# Patient Record
Sex: FEMALE | Race: BLACK OR AFRICAN AMERICAN | URBAN - METROPOLITAN AREA
[De-identification: names, ages, dates, MRNs, and addresses within clinical notes are randomized per-mention and may not be internally consistent; named-entity substitution may affect disease eponyms.]

---

## 2017-03-28 ENCOUNTER — OFFICE VISIT (OUTPATIENT)
Dept: FAMILY MEDICINE CLINIC | Age: 47
End: 2017-03-28

## 2017-03-28 VITALS
WEIGHT: 219 LBS | HEIGHT: 64 IN | BODY MASS INDEX: 37.39 KG/M2 | TEMPERATURE: 98.2 F | SYSTOLIC BLOOD PRESSURE: 135 MMHG | HEART RATE: 89 BPM | RESPIRATION RATE: 17 BRPM | DIASTOLIC BLOOD PRESSURE: 85 MMHG

## 2017-03-28 DIAGNOSIS — I10 ESSENTIAL HYPERTENSION: ICD-10-CM

## 2017-03-28 DIAGNOSIS — Z99.89 OSA ON CPAP: ICD-10-CM

## 2017-03-28 DIAGNOSIS — E66.9 OBESITY (BMI 30-39.9): Primary | ICD-10-CM

## 2017-03-28 DIAGNOSIS — G47.33 OSA ON CPAP: ICD-10-CM

## 2017-03-28 DIAGNOSIS — E55.9 VITAMIN D DEFICIENCY: ICD-10-CM

## 2017-03-28 NOTE — PATIENT INSTRUCTIONS
Meal Goals:  Shake at Breakfast, Lunch and afternoon    Meal at night: Low-carb + Protein + Veggies (Carb goal is 40 - 50 grams, Protein 30 - 40, Kcal 400 to 500)    Look into getting a:    Counselor      Let's set a goal of by our appointment in July:  She's comfortable with setting a goal of losing 20 lb in 16 weeks. Body Mass Index: Care Instructions  Your Care Instructions    Body mass index (BMI) can help you see if your weight is raising your risk for health problems. It uses a formula to compare how much you weigh with how tall you are. A BMI between 18.5 and 24.9 is considered healthy. A BMI between 25 and 29.9 is considered overweight. A BMI of 30 or higher is considered obese. If your BMI is in the normal range, it means that you have a lower risk for weight-related health problems. If your BMI is in the overweight or obese range, you may be at increased risk for weight-related health problems, such as high blood pressure, heart disease, stroke, arthritis or joint pain, and diabetes. BMI is just one measure of your risk for weight-related health problems. You may be at higher risk for health problems if you are not active, you eat an unhealthy diet, or you drink too much alcohol or use tobacco products. Follow-up care is a key part of your treatment and safety. Be sure to make and go to all appointments, and call your doctor if you are having problems. It's also a good idea to know your test results and keep a list of the medicines you take. How can you care for yourself at home? · Practice healthy eating habits. This includes eating plenty of fruits, vegetables, whole grains, lean protein, and low-fat dairy. · Get at least 30 minutes of exercise 5 days a week or more. Brisk walking is a good choice. You also may want to do other activities, such as running, swimming, cycling, or playing tennis or team sports. · Do not smoke. Smoking can increase your risk for health problems.  If you need help quitting, talk to your doctor about stop-smoking programs and medicines. These can increase your chances of quitting for good. · Limit alcohol to 2 drinks a day for men and 1 drink a day for women. Too much alcohol can cause health problems. If you have a BMI higher than 25  · Your doctor may do other tests to check your risk for weight-related health problems. This may include measuring the distance around your waist. A waist measurement of more than 40 inches in men or 35 inches in women can increase the risk of weight-related health problems. · Talk with your doctor about steps you can take to stay healthy or improve your health. You may need to make lifestyle changes to lose weight and stay healthy, such as changing your diet and getting regular exercise. Where can you learn more? Go to http://ashley-ivan.info/. Enter S176 in the search box to learn more about \"Body Mass Index: Care Instructions. \"  Current as of: February 16, 2016  Content Version: 11.1  © 0292-1423 [employer]. Care instructions adapted under license by [employer] (which disclaims liability or warranty for this information). If you have questions about a medical condition or this instruction, always ask your healthcare professional. [place] [address] any warranty or liability for your use of this information.

## 2017-03-28 NOTE — Clinical Note
I had a long discussion w/ Anna Barreto and her father about whether she is ready to take advantage of being in the program.  Look into getting a:  Counselor   Let's set a goal of by our appointment in July: She's comfortable with setting a goal of losing 20 lb in 16 weeks.

## 2017-03-28 NOTE — PROGRESS NOTES
[facility] Progress Note:   F/up Physician Visit    CC: Weight Management  Violet Castle is a 48 y.o. female who is here for her  f/up physician visit for the VLCD / LCD Program.    Weight History  Ideal body weight: 120 lb 9.5 oz (54.7 kg)  Adjusted ideal body weight: 159 lb 15.3 oz (72.6 kg) (Based on [employer] Tables)      Goal wt = 150 lb  Wt went up as high as 232 lb last month (around the time she had her TIA and her BP went up)      Wt Readings from Last 10 Encounters:   03/28/17 219 lb (99.3 kg)   12/20/16 216 lb (98 kg)   08/11/16 198 lb 3.2 oz (89.9 kg)   08/02/16 196 lb 3.2 oz (89 kg)   07/28/16 197 lb 12.8 oz (89.7 kg)   07/22/16 193 lb 12.8 oz (87.9 kg)   07/13/16 196 lb 12.8 oz (89.3 kg)   07/07/16 189 lb 6.4 oz (85.9 kg)   06/07/16 198 lb 3.2 oz (89.9 kg)   05/23/16 192 lb 9.6 oz (87.4 kg)       Weight Metrics 3/28/2017 12/20/2016 12/20/2016 8/11/2016 8/8/2016 8/2/2016 8/2/2016   Weight 219 lb - 216 lb - 198 lb 3.2 oz - 196 lb 3.2 oz   Waist Measure Inches - 39 - 36 - 35 -   Body Fat % - 37 - - - 39 -   BMI 37.59 kg/m2 - 37.08 kg/m2 - 34.02 kg/m2 - 33.66 kg/m2           Medications Currently Taking  Outpatient Prescriptions Marked as Taking for the 3/28/17 encounter (Office Visit) with Josselyn Bloom, DO   Medication Sig Dispense Refill    cholecalciferol, VITAMIN D3, (VITAMIN D3) 5,000 unit tab tablet Take  by mouth daily.  DOCOSAHEXANOIC ACID/EPA (FISH OIL PO) Take  by mouth daily. Patient states she is taking 800 EPA/ 500 DHA. Patient states she is taking 1 tablespoon once a day.  OTHER 5 Tabs daily. Patient states she is taking Wobenzym-N 5 tablets 30 minutes prior to eating.  CALCIUM CARBONATE/VITAMIN D3 (CALCIUM + D PO) Take 1 Tab by mouth two (2) times a day. Patient states she is taking Citracal petite Calcium 200 mg and Vitamin D 100 mg.  cholecalciferol, vitamin D3, 2,000 unit tab Take 3 Tabs by mouth.       AMOXICILLIN/POTASSIUM CLAV (AUGMENTIN PO) Take  by mouth.  amLODIPine (NORVASC) 5 mg tablet Take 2.5 mg by mouth daily.  MELATONIN (MELATIN PO) Take  by mouth.  ibuprofen (ADVIL) 200 mg tablet Take 400 mg by mouth every eight (8) hours as needed for Pain.  TURMERIC, BULK, 1 Tab by Does Not Apply route three (3) times daily. Patient states 1 capsule of her turmeric is 500 mg      ACETYLGLUCOSAMINE (N-ACETYL-ALPHA-D-GLUCOSAMINE) 1 Tab by Does Not Apply route daily.  OTHER 1 Tab two (2) times a day. Synergy vitamin      fexofenadine (ALLEGRA) 180 mg tablet Take  by mouth daily.  potassium chloride SA (MICRO-K) 10 mEq capsule Take 20 mEq by mouth three (3) times daily.  norethindrone acetate (AYGESTIN) 5 mg tablet 5 mg.  coenzyme q10-vitamin e (COQ10 ) 100-100 mg-unit cap Take 200 mg by mouth.  montelukast (SINGULAIR) 10 mg tablet Take 10 mg by mouth daily.  NASONEX 50 mcg/actuation nasal spray 50 Sprays two (2) times a day.  DULERA 100-5 mcg/actuation HFA inhaler 2 Puffs two (2) times a day.  albuterol (VENTOLIN HFA) 90 mcg/actuation inhaler Take  by inhalation as needed.  fluticasone (FLOVENT DISKUS) 100 mcg/actuation dsdv Take  by inhalation.  magnesium 250 mg tab Take 400 mg by mouth three (3) times daily.  cyclobenzaprine (FLEXERIL) 10 mg tablet Take  by mouth three (3) times daily as needed for Muscle Spasm(s).  omega-3 fatty acids-vitamin e (FISH OIL) 1,000 mg cap Take 1 Cap by mouth.  cinnamon bark (CINNAMON) 500 mg cap Take 1 Tab by mouth two (2) times a day. Patient states the Cinnamon 1000 mg  is combined with Chromium 400 mg      Biotin 2,500 mcg cap Take  by mouth.  ascorbic acid (VITAMIN C) 250 mg tablet Take 500 mg by mouth.  aspirin 81 mg chewable tablet Take 81 mg by mouth daily. Patient states that she takes aspril 81 mg some times twice a week.  multivitamin (ONE A DAY) tablet Take 1 Tab by mouth daily.       OMALIZUMAB Puja Burton SC) by SubCUTAneous route every thirty (30) days. Norvasc 2.5   Cozaar 100  HCTZ 25 in AM +     Participation   Did you attend clinic and class last week? No - Pt is doing the program;     Review of Systems  Since your last visit, have you experienced any complications? Not with the diet    Are you taking an appetite suppressant? no    BP Readings from Last 3 Encounters:   03/28/17 135/85   12/20/16 143/83   08/11/16 (!) 144/93       Have you received any other medical care this week? Not this week but she did have a TIA last month and had to get more aggressive with her BP control      Diet  How many ounces of calorie-free liquids did you consume each day? 63 oz    How many meal replacements did you take each day? Shake Breakfast  [employer]  Shake Afternoon  Low-carb + Protein + Veggies    Did you have any problems adhering to the program?  Yes - Wt gain via snacking         Objective  Visit Vitals    /85    Pulse 89    Temp 98.2 °F (36.8 °C) (Oral)    Resp 17    Ht 5' 4\" (1.626 m)    Wt 219 lb (99.3 kg)    BMI 37.59 kg/m2     No LMP recorded. Patient is not currently having periods (Reason: Chemically Induced). Physical Exam  Appearance: Obese, A&O x 3, NAD  HEENT:  NC/AT, EOMI, PERRL, No scleral icterus    Assessment / Plan    Encounter Diagnoses   Name Primary?  Obesity (BMI 30-39. 9) Yes    DEANN on CPAP     Vitamin D deficiency     Essential hypertension        1. Weight management    needs improvement   Progress was reviewed with patient   Goal(s) for next appointment:   See patient instructions    I had a long discussion w/ Richmond Neville and her father about whether she is ready to take advantage of being in the program    2.   Labs    Latest results reviewed with patient   Routine monitoring labs ordered no    -Lab slip given to pt for off-site [facility] labs no    15 minutes of the 30 minutes face to face time with Richmond Neville consisted of counseling & coordinating and/or discussing treatment plans in reference to her The primary encounter diagnosis was Obesity (BMI 30-39.9). Diagnoses of DEANN on CPAP, Vitamin D deficiency, and Essential hypertension were also pertinent to this visit.

## 2017-03-28 NOTE — MR AVS SNAPSHOT
Visit Information Date & Time Provider Department Dept. Phone Encounter #  
 3/28/2017  2:00 PM Xander Brown, [address] [facility],[address] [phone] Follow-up Instructions Return in about 4 months (around 7/25/2017) for MSWL & Lab Draw. Routing History Follow-up and Disposition History Your Appointments 7/25/2017 67:91 AM  
METABOLIC PROGRAM 15 with DO Kirsten Logno 23 [facility] Appt Note: mswl Randymosadie Raheem. [address]  
  
   
 [address] [facility] Upcoming Health Maintenance Date Due Pneumococcal 19-64 Medium Risk (1 of 1 - PPSV23) 9/8/1987 DTaP/Tdap/Td series (1 - Tdap) 9/8/1989 PAP AKA CERVICAL CYTOLOGY 9/8/1989 INFLUENZA AGE 9 TO ADULT 8/1/2016 Allergies as of 3/28/2017  Review Complete On: 3/28/2017 By: Xander Brown DO Severity Noted Reaction Type Reactions Metformin  07/14/2015   Side Effect Other (comments) lethargic Current Immunizations  Reviewed on 3/28/2017 No immunizations on file. Reviewed by Sheila Roth LPN on 7/21/5177 at  2:03 PM  
You Were Diagnosed With   
  
 Codes Comments Obesity (BMI 30-39.9)    -  Primary ICD-10-CM: H66.9 ICD-9-CM: 278.00 DEANN on CPAP     ICD-10-CM: G47.33 
ICD-9-CM: 327.23 Vitamin D deficiency     ICD-10-CM: E55.9 ICD-9-CM: 268.9 Essential hypertension     ICD-10-CM: I10 
ICD-9-CM: 401.9 Vitals BP Pulse Temp Resp Height(growth percentile) Weight(growth percentile) 135/85 89 98.2 °F (36.8 °C) (Oral) 17 5' 4\" (1.626 m) 219 lb (99.3 kg) BMI OB Status Smoking Status 37.59 kg/m2 Chemically Induced Never Smoker Vitals History BMI and BSA Data Body Mass Index Body Surface Area  
 37.59 kg/m 2 2.12 m 2 Preferred Pharmacy Pharmacy Name Phone [id number] [facility], [address] [place] [address] AT [address] [facility] [address] [phone] Your Updated Medication List  
  
   
This list is accurate as of: 3/28/17  6:26 PM.  Always use your most recent med list. ADVIL 200 mg tablet Generic drug:  ibuprofen Take 400 mg by mouth every eight (8) hours as needed for Pain. ALLEGRA 180 mg tablet Generic drug:  fexofenadine Take  by mouth daily. amLODIPine 5 mg tablet Commonly known as:  Izetta Harder Take 2.5 mg by mouth daily. aspirin 81 mg chewable tablet Take 81 mg by mouth daily. Patient states that she takes aspril 81 mg some times twice a week. AUGMENTIN PO Take  by mouth. Biotin 2,500 mcg Cap Take  by mouth. CALCIUM + D PO Take 1 Tab by mouth two (2) times a day. Patient states she is taking Citracal petite Calcium 200 mg and Vitamin D 100 mg.  
  
 * cholecalciferol (vitamin D3) 2,000 unit Tab Take 3 Tabs by mouth. * cholecalciferol (VITAMIN D3) 5,000 unit Tab tablet Commonly known as:  VITAMIN D3 Take  by mouth daily. CINNAMON 500 mg Cap Generic drug:  cinnamon bark Take 1 Tab by mouth two (2) times a day. Patient states the Cinnamon 1000 mg  is combined with Chromium 400 mg  
  
 COQ10  100-100 mg-unit Cap Generic drug:  coenzyme q10-vitamin e Take 200 mg by mouth. DULERA 100-5 mcg/actuation HFA inhaler Generic drug:  mometasone-formoterol 2 Puffs two (2) times a day. FISH OIL 1,000 mg Cap Generic drug:  omega-3 fatty acids-vitamin e Take 1 Cap by mouth. FISH OIL PO Take  by mouth daily. Patient states she is taking 800 EPA/ 500 DHA. Patient states she is taking 1 tablespoon once a day. FLEXERIL 10 mg tablet Generic drug:  cyclobenzaprine Take  by mouth three (3) times daily as needed for Muscle Spasm(s). FLOVENT DISKUS 100 mcg/actuation Dsdv Generic drug:  fluticasone Take  by inhalation. magnesium 250 mg Tab Take 400 mg by mouth three (3) times daily. MELATIN PO Take  by mouth.  
  
 multivitamin tablet Commonly known as:  ONE A DAY Take 1 Tab by mouth daily. N-ACETYL-ALPHA-D-GLUCOSAMINE  
1 Tab by Does Not Apply route daily. NASONEX 50 mcg/actuation nasal spray Generic drug:  mometasone 50 Sprays two (2) times a day. norethindrone acetate 5 mg tablet Commonly known as:  AYGESTIN  
5 mg.  
  
 * OTHER  
1 Tab two (2) times a day. Synergy vitamin * OTHER  
5 Tabs daily. Patient states she is taking Wobenzym-N 5 tablets 30 minutes prior to eating. potassium chloride SA 10 mEq capsule Commonly known as:  Robert Clemente Take 20 mEq by mouth three (3) times daily. SINGULAIR 10 mg tablet Generic drug:  montelukast  
Take 10 mg by mouth daily. TURMERIC (BULK) 1 Tab by Does Not Apply route three (3) times daily. Patient states 1 capsule of her turmeric is 500 mg VENTOLIN HFA 90 mcg/actuation inhaler Generic drug:  albuterol Take  by inhalation as needed. VITAMIN C 250 mg tablet Generic drug:  ascorbic acid (vitamin C) Take 500 mg by mouth. XOLAIR SC  
by SubCUTAneous route every thirty (30) days. * Notice: This list has 4 medication(s) that are the same as other medications prescribed for you. Read the directions carefully, and ask your doctor or other care provider to review them with you. Follow-up Instructions Return in about 4 months (around 7/25/2017) for MSWL & Lab Draw. Patient Instructions Meal Goals: 
Shake at Breakfast, Lunch and afternoon Meal at night: Low-carb + Protein + Veggies (Carb goal is 40 - 50 grams, Protein 30 - 40, Kcal 400 to 500) Look into getting a: 
 Counselor Let's set a goal of by our appointment in July: 
She's comfortable with setting a goal of losing 20 lb in 16 weeks. Body Mass Index: Care Instructions Your Care Instructions Body mass index (BMI) can help you see if your weight is raising your risk for health problems. It uses a formula to compare how much you weigh with how tall you are. A BMI between 18.5 and 24.9 is considered healthy. A BMI between 25 and 29.9 is considered overweight. A BMI of 30 or higher is considered obese. If your BMI is in the normal range, it means that you have a lower risk for weight-related health problems. If your BMI is in the overweight or obese range, you may be at increased risk for weight-related health problems, such as high blood pressure, heart disease, stroke, arthritis or joint pain, and diabetes. BMI is just one measure of your risk for weight-related health problems. You may be at higher risk for health problems if you are not active, you eat an unhealthy diet, or you drink too much alcohol or use tobacco products. Follow-up care is a key part of your treatment and safety. Be sure to make and go to all appointments, and call your doctor if you are having problems. It's also a good idea to know your test results and keep a list of the medicines you take. How can you care for yourself at home? · Practice healthy eating habits. This includes eating plenty of fruits, vegetables, whole grains, lean protein, and low-fat dairy. · Get at least 30 minutes of exercise 5 days a week or more. Brisk walking is a good choice. You also may want to do other activities, such as running, swimming, cycling, or playing tennis or team sports. · Do not smoke. Smoking can increase your risk for health problems. If you need help quitting, talk to your doctor about stop-smoking programs and medicines. These can increase your chances of quitting for good. · Limit alcohol to 2 drinks a day for men and 1 drink a day for women. Too much alcohol can cause health problems. If you have a BMI higher than 25 · Your doctor may do other tests to check your risk for weight-related health problems. This may include measuring the distance around your waist. A waist measurement of more than 40 inches in men or 35 inches in women can increase the risk of weight-related health problems. · Talk with your doctor about steps you can take to stay healthy or improve your health. You may need to make lifestyle changes to lose weight and stay healthy, such as changing your diet and getting regular exercise. Where can you learn more? Go to http://ashley-ivan.info/. Enter S176 in the search box to learn more about \"Body Mass Index: Care Instructions. \" Current as of: February 16, 2016 Content Version: 11.1 © 6919-7240 [employer]. Care instructions adapted under license by [employer] (which disclaims liability or warranty for this information). If you have questions about a medical condition or this instruction, always ask your healthcare professional. [address] any warranty or liability for your use of this information. Patient Instructions History Introducing [facility]! Dear Joslyn: Thank you for requesting a [employer] account. Our records indicate that you already have an active [employer] account. You can access your account anytime at [email or web address] Did you know that you can access your hospital and ER discharge instructions at any time in [employer]St[employer]? You can also review all of your test results from your hospital stay or ER visit. Additional Information If you have questions, please visit the Frequently Asked Questions section of the [employer] website at [email or web address] Remember, [employer] is NOT to be used for urgent needs. For medical emergencies, dial 911. Now available from your iPhone and Android! Please provide this summary of care documentation to your next provider. Your primary care clinician is listed as Sandrine Dubose. If you have any questions after today's visit, please call [phone].

## 2017-08-29 ENCOUNTER — OFFICE VISIT (OUTPATIENT)
Dept: FAMILY MEDICINE CLINIC | Age: 47
End: 2017-08-29

## 2017-08-29 VITALS
SYSTOLIC BLOOD PRESSURE: 125 MMHG | BODY MASS INDEX: 39.95 KG/M2 | TEMPERATURE: 97.2 F | HEART RATE: 90 BPM | HEIGHT: 64 IN | RESPIRATION RATE: 17 BRPM | DIASTOLIC BLOOD PRESSURE: 86 MMHG | WEIGHT: 234 LBS

## 2017-08-29 DIAGNOSIS — E66.01 MORBID OBESITY DUE TO EXCESS CALORIES (HCC): Primary | ICD-10-CM

## 2017-08-29 DIAGNOSIS — I10 ESSENTIAL HYPERTENSION: ICD-10-CM

## 2017-08-29 DIAGNOSIS — E55.9 VITAMIN D DEFICIENCY: ICD-10-CM

## 2017-08-29 NOTE — Clinical Note
Dung Bennett came in today and has gained more weight. I told her that we gave it a good try but our experiment of her trying to do the program while away is not working out. She agreed and is going to drop out of the program.  She is working with Priscilla Bonilla (the counselor) and if she and Priscilla Bonilla feel that she's ready to start the program AND if she can attend all the Webinars, I'm willing to give it another try.    Otherwise she's going to stay out of the program.

## 2017-08-29 NOTE — MR AVS SNAPSHOT
Visit Information Date & Time Provider Department Dept. Phone Encounter #  
 8/29/2017 12:00 PM Francisco TomlinSheree 13 [id number] Follow-up Instructions Routing History Follow-up and Disposition History Upcoming Health Maintenance Date Due Pneumococcal 19-64 Medium Risk (1 of 1 - PPSV23) 9/8/1987 DTaP/Tdap/Td series (1 - Tdap) 9/8/1989 PAP AKA CERVICAL CYTOLOGY 9/8/1989 INFLUENZA AGE 9 TO ADULT 8/1/2017 Allergies as of 8/29/2017  Review Complete On: 8/29/2017 By: Francisco Tomlin DO Severity Noted Reaction Type Reactions Metformin  07/14/2015   Side Effect Other (comments) lethargic Current Immunizations  Reviewed on 3/28/2017 No immunizations on file. Not reviewed this visit You Were Diagnosed With   
  
 Codes Comments Morbid obesity due to excess calories ([facility]    -  Primary ICD-10-CM: E66.01 
ICD-9-CM: 278.01 Vitamin D deficiency     ICD-10-CM: E55.9 ICD-9-CM: 268.9 Essential hypertension     ICD-10-CM: I10 
ICD-9-CM: 401.9 Vitals BP Pulse Temp Resp Height(growth percentile) Weight(growth percentile) 125/86 90 97.2 °F (36.2 °C) (Oral) 17 5' 4\" (1.626 m) 234 lb (106.1 kg) BMI OB Status Smoking Status 40.17 kg/m2 Chemically Induced Never Smoker Vitals History BMI and BSA Data Body Mass Index Body Surface Area  
 40.17 kg/m 2 2.19 m 2 Preferred Pharmacy Pharmacy Name Phone [id number] AT [address] [facility] [address] [phone] Your Updated Medication List  
  
   
This list is accurate as of: 8/29/17  5:36 PM.  Always use your most recent med list. ADVIL 200 mg tablet Generic drug:  ibuprofen Take 400 mg by mouth every eight (8) hours as needed for Pain. ALLEGRA 180 mg tablet Generic drug:  fexofenadine Take  by mouth daily. amLODIPine 5 mg tablet Commonly known as:  Braulio Rodriguez Take 2.5 mg by mouth daily. aspirin 81 mg chewable tablet Take 81 mg by mouth daily. Patient states that she takes aspril 81 mg some times twice a week. AUGMENTIN PO Take  by mouth. Biotin 2,500 mcg Cap Take  by mouth. CALCIUM + D PO Take 1 Tab by mouth two (2) times a day. Patient states she is taking Citracal petite Calcium 200 mg and Vitamin D 100 mg.  
  
 * cholecalciferol (vitamin D3) 2,000 unit Tab Take 3 Tabs by mouth. * cholecalciferol (VITAMIN D3) 5,000 unit Tab tablet Commonly known as:  VITAMIN D3 Take  by mouth daily. CINNAMON 500 mg Cap Generic drug:  cinnamon bark Take 1 Tab by mouth two (2) times a day. Patient states the Cinnamon 1000 mg  is combined with Chromium 400 mg  
  
 COQ10  100-100 mg-unit Cap Generic drug:  coenzyme q10-vitamin e Take 200 mg by mouth. DULERA 100-5 mcg/actuation HFA inhaler Generic drug:  mometasone-formoterol 2 Puffs two (2) times a day. FISH OIL 1,000 mg Cap Generic drug:  omega-3 fatty acids-vitamin e Take 1 Cap by mouth. FISH OIL PO Take  by mouth daily. Patient states she is taking 800 EPA/ 500 DHA. Patient states she is taking 1 tablespoon once a day. FLEXERIL 10 mg tablet Generic drug:  cyclobenzaprine Take  by mouth three (3) times daily as needed for Muscle Spasm(s). FLOVENT DISKUS 100 mcg/actuation Dsdv Generic drug:  fluticasone Take  by inhalation. magnesium 250 mg Tab Take 400 mg by mouth three (3) times daily. MELATIN PO Take  by mouth.  
  
 multivitamin tablet Commonly known as:  ONE A DAY Take 1 Tab by mouth daily. N-ACETYL-ALPHA-D-GLUCOSAMINE  
1 Tab by Does Not Apply route daily. NASONEX 50 mcg/actuation nasal spray Generic drug:  mometasone 50 Sprays two (2) times a day. norethindrone acetate 5 mg tablet Commonly known as:  AYGESTIN  
5 mg. * OTHER  
1 Tab two (2) times a day. Synergy vitamin * OTHER  
5 Tabs daily. Patient states she is taking Wobenzym-N 5 tablets 30 minutes prior to eating. potassium chloride SA 10 mEq capsule Commonly known as:  Arvell Clare Take 20 mEq by mouth three (3) times daily. SINGULAIR 10 mg tablet Generic drug:  montelukast  
Take 10 mg by mouth daily. TURMERIC (BULK) 1 Tab by Does Not Apply route three (3) times daily. Patient states 1 capsule of her turmeric is 500 mg VENTOLIN HFA 90 mcg/actuation inhaler Generic drug:  albuterol Take  by inhalation as needed. VITAMIN C 250 mg tablet Generic drug:  ascorbic acid (vitamin C) Take 500 mg by mouth. XOLAIR SC  
by SubCUTAneous route every thirty (30) days. * Notice: This list has 4 medication(s) that are the same as other medications prescribed for you. Read the directions carefully, and ask your doctor or other care provider to review them with you. Patient Instructions Body Mass Index: Care Instructions Your Care Instructions Body mass index (BMI) can help you see if your weight is raising your risk for health problems. It uses a formula to compare how much you weigh with how tall you are. · A BMI lower than 18.5 is considered underweight. · A BMI between 18.5 and 24.9 is considered healthy. · A BMI between 25 and 29.9 is considered overweight. A BMI of 30 or higher is considered obese. If your BMI is in the normal range, it means that you have a lower risk for weight-related health problems. If your BMI is in the overweight or obese range, you may be at increased risk for weight-related health problems, such as high blood pressure, heart disease, stroke, arthritis or joint pain, and diabetes.  If your BMI is in the underweight range, you may be at increased risk for health problems such as fatigue, lower protection (immunity) against illness, muscle loss, bone loss, hair loss, and hormone problems. BMI is just one measure of your risk for weight-related health problems. You may be at higher risk for health problems if you are not active, you eat an unhealthy diet, or you drink too much alcohol or use tobacco products. Follow-up care is a key part of your treatment and safety. Be sure to make and go to all appointments, and call your doctor if you are having problems. It's also a good idea to know your test results and keep a list of the medicines you take. How can you care for yourself at home? · Practice healthy eating habits. This includes eating plenty of fruits, vegetables, whole grains, lean protein, and low-fat dairy. · If your doctor recommends it, get more exercise. Walking is a good choice. Bit by bit, increase the amount you walk every day. Try for at least 30 minutes on most days of the week. · Do not smoke. Smoking can increase your risk for health problems. If you need help quitting, talk to your doctor about stop-smoking programs and medicines. These can increase your chances of quitting for good. · Limit alcohol to 2 drinks a day for men and 1 drink a day for women. Too much alcohol can cause health problems. If you have a BMI higher than 25 · Your doctor may do other tests to check your risk for weight-related health problems. This may include measuring the distance around your waist. A waist measurement of more than 40 inches in men or 35 inches in women can increase the risk of weight-related health problems. · Talk with your doctor about steps you can take to stay healthy or improve your health. You may need to make lifestyle changes to lose weight and stay healthy, such as changing your diet and getting regular exercise. If you have a BMI lower than 18.5 · Your doctor may do other tests to check your risk for health problems.  
· Talk with your doctor about steps you can take to stay healthy or improve your health. You may need to make lifestyle changes to gain or maintain weight and stay healthy, such as getting more healthy foods in your diet and doing exercises to build muscle. Where can you learn more? Go to http://ashley-ivan.info/. Enter S176 in the search box to learn more about \"Body Mass Index: Care Instructions. \" Current as of: January 23, 2017 Content Version: 11.3 © 6444-3677 [employer]. Care instructions adapted under license by [employer] (which disclaims liability or warranty for this information). If you have questions about a medical condition or this instruction, always ask your healthcare professional. [address] any warranty or liability for your use of this information. Patient Instructions History Introducing [facility]! Dear Paz Scheuermann: Thank you for requesting a [employer] account. Our records indicate that you already have an active [employer] account. You can access your account anytime at [email or web address]. "[employer] Did you know that you can access your hospital and ER discharge instructions at any time in [employer]? You can also review all of your test results from your hospital stay or ER visit. Additional Information If you have questions, please visit the Frequently Asked Questions section of the [employer] website at [email or web address]. "[employer]/. Remember, [employer] is NOT to be used for urgent needs. For medical emergencies, dial 911. Now available from your iPhone and Android! Please provide this summary of care documentation to your next provider. Your primary care clinician is listed as Roshan Allen. If you have any questions after today's visit, please call [phone].

## 2017-08-29 NOTE — PROGRESS NOTES
[facility] Progress Note:   F/up Physician Visit for the VLCD / LCD Program    CC: Weight Management    Tish Charles is a 48 y.o. female who is here for her f/up physician visit for the [facility].    Weight History  Ideal body weight: 120 lb 9.5 oz (54.7 kg)  Adjusted ideal body weight: 165 lb 15.3 oz (75.3 kg) (Based on [employer] Tables)    Wt Readings from Last 10 Encounters:   08/29/17 234 lb (106.1 kg)   03/28/17 219 lb (99.3 kg)   12/20/16 216 lb (98 kg)   08/11/16 198 lb 3.2 oz (89.9 kg)   08/02/16 196 lb 3.2 oz (89 kg)   07/28/16 197 lb 12.8 oz (89.7 kg)   07/22/16 193 lb 12.8 oz (87.9 kg)   07/13/16 196 lb 12.8 oz (89.3 kg)   07/07/16 189 lb 6.4 oz (85.9 kg)   06/07/16 198 lb 3.2 oz (89.9 kg)       Weight Metrics 8/29/2017 3/28/2017 12/20/2016 12/20/2016 8/11/2016 8/8/2016 8/2/2016   Weight 234 lb 219 lb - 216 lb - 198 lb 3.2 oz -   Waist Measure Inches - - 39 - 36 - 35   Body Fat % - - 37 - - - 39   BMI 40.17 kg/m2 37.59 kg/m2 - 37.08 kg/m2 - 34.02 kg/m2 -         Medications Currently Taking  Outpatient Prescriptions Marked as Taking for the 8/29/17 encounter (Office Visit) with Enedina Price DO   Medication Sig Dispense Refill    cholecalciferol, VITAMIN D3, (VITAMIN D3) 5,000 unit tab tablet Take  by mouth daily.  OTHER 5 Tabs daily. Patient states she is taking Wobenzym-N 5 tablets 30 minutes prior to eating.  amLODIPine (NORVASC) 5 mg tablet Take 2.5 mg by mouth daily.  TURMERIC, BULK, 1 Tab by Does Not Apply route three (3) times daily. Patient states 1 capsule of her turmeric is 500 mg      OTHER 1 Tab two (2) times a day. Synergy vitamin      fexofenadine (ALLEGRA) 180 mg tablet Take  by mouth daily.  potassium chloride SA (MICRO-K) 10 mEq capsule Take 20 mEq by mouth three (3) times daily.  norethindrone acetate (AYGESTIN) 5 mg tablet 5 mg.       coenzyme q10-vitamin e (COQ10 ) 100-100 mg-unit cap Take 200 mg by mouth.      montelukast (SINGULAIR) 10 mg tablet Take 10 mg by mouth daily.  NASONEX 50 mcg/actuation nasal spray 50 Sprays two (2) times a day.  DULERA 100-5 mcg/actuation HFA inhaler 2 Puffs two (2) times a day.  albuterol (VENTOLIN HFA) 90 mcg/actuation inhaler Take  by inhalation as needed.  magnesium 250 mg tab Take 400 mg by mouth three (3) times daily.  cyclobenzaprine (FLEXERIL) 10 mg tablet Take  by mouth three (3) times daily as needed for Muscle Spasm(s).  cinnamon bark (CINNAMON) 500 mg cap Take 1 Tab by mouth two (2) times a day. Patient states the Cinnamon 1000 mg  is combined with Chromium 400 mg      Biotin 2,500 mcg cap Take  by mouth.  ascorbic acid (VITAMIN C) 250 mg tablet Take 500 mg by mouth.  aspirin 81 mg chewable tablet Take 81 mg by mouth daily. Patient states that she takes aspril 81 mg some times twice a week.  multivitamin (ONE A DAY) tablet Take 1 Tab by mouth daily.  OMALIZUMAB (XOLAIR SC) by SubCUTAneous route every thirty (30) days. Medically a lot of stuff going on:  Had a TIA in Feb  Right knee meniscus tear and baker's cyst  Left knee OA  Bone spur on lumbar verterbrate  Recurrent UTI starting last Nov  Recurrent HA    Stress thallium was ordered by her PCP - no results yet and she is still planning to fly back tomorrow    Has ortho appt tomorrow AM and is flying back in the afternoon. Participation   Have you been attending class on a regular basis? no    She did 4 sessions with [facility] for [facility] and has one more session in Dec when she comes back from [place]. She hasn't met her in person yet, only through Skype every 2 to 2.5 weeks. Eating extremely well x 8 weeks but no wt loss. Review of Systems  Since your last visit, have you experienced any complications?  no    Are you taking an appetite suppressant? no    BP Readings from Last 3 Encounters:   08/29/17 125/86   03/28/17 135/85   12/20/16 143/83 Have you received any other medical care this week? no  If yes, where and for what? Have you discontinued or changed any medicine or dose of your medicine(s) since your last visit with Dr Ean Tan? no          Diet  How many ounces of calorie-free liquids did you consume each day? 64 oz  How many meal replacements did you take each day? 0  Did you have any problems adhering to the program?  yes       Exercise  Aerobic exercise: 0 min  Resistance exercise: 0 workouts / week  Any discomfort?  no          Objective  Visit Vitals    /86    Pulse 90    Temp 97.2 °F (36.2 °C) (Oral)    Resp 17    Ht 5' 4\" (1.626 m)    Wt 234 lb (106.1 kg)    BMI 40.17 kg/m2     No LMP recorded. Patient is not currently having periods (Reason: Chemically Induced). Physical Exam  Appearance: Morbidly obese, A&O x 3, NAD  HEENT:  NC/AT, EOMI, PERRL, No scleral icterus    Assessment / Plan    Encounter Diagnoses   Name Primary?  Morbid obesity due to excess calories ([facility] Yes    Vitamin D deficiency     Essential hypertension        1. Weight management    needs improvement   Progress was reviewed with patient   Goal(s) for next appointment:   Her wt is up 15 lb more since her last appointment with me. To me, it's obvious that she is not ready to be in our program.  They are 8 hr ahead during DST and then 9 hr ahead when off DST  She works from 7:45AM - 2:15PM.        2.  Labs    Latest results reviewed with patient   Routine monitoring labs ordered no    -Lab slip given to pt for off-site [facility] labs no    10 minutes of the 15 minutes face to face time with Abdullahi Ordonez consisted of counseling & coordinating and/or discussing treatment plans in reference to her The primary encounter diagnosis was Morbid obesity due to excess calories ([address] Diagnoses of Vitamin D deficiency and Essential hypertension were also pertinent to this visit.

## 2022-03-19 PROBLEM — E66.01 MORBID OBESITY DUE TO EXCESS CALORIES (HCC): Status: ACTIVE | Noted: 2017-08-29

## 2022-05-11 DIAGNOSIS — E66.01 MORBID OBESITY (HCC): ICD-10-CM

## 2022-05-11 DIAGNOSIS — Z76.89 ENCOUNTER FOR WEIGHT MANAGEMENT: Primary | ICD-10-CM

## 2022-07-12 ENCOUNTER — OFFICE VISIT (OUTPATIENT)
Dept: SURGERY | Age: 52
End: 2022-07-12
Payer: COMMERCIAL

## 2022-07-12 VITALS
TEMPERATURE: 98.7 F | HEIGHT: 64 IN | DIASTOLIC BLOOD PRESSURE: 83 MMHG | SYSTOLIC BLOOD PRESSURE: 138 MMHG | BODY MASS INDEX: 50.02 KG/M2 | WEIGHT: 293 LBS | OXYGEN SATURATION: 97 % | RESPIRATION RATE: 20 BRPM | HEART RATE: 100 BPM

## 2022-07-12 DIAGNOSIS — I10 ESSENTIAL HYPERTENSION: ICD-10-CM

## 2022-07-12 DIAGNOSIS — I89.0 ACQUIRED LYMPHEDEMA OF LOWER EXTREMITY: ICD-10-CM

## 2022-07-12 DIAGNOSIS — Z13.1 SCREENING FOR DIABETES MELLITUS: ICD-10-CM

## 2022-07-12 DIAGNOSIS — G47.33 OSA ON CPAP: ICD-10-CM

## 2022-07-12 DIAGNOSIS — Z99.89 OSA ON CPAP: ICD-10-CM

## 2022-07-12 DIAGNOSIS — E66.01 CLASS 3 SEVERE OBESITY DUE TO EXCESS CALORIES WITH SERIOUS COMORBIDITY IN ADULT, UNSPECIFIED BMI (HCC): Primary | ICD-10-CM

## 2022-07-12 DIAGNOSIS — E03.9 ACQUIRED HYPOTHYROIDISM: ICD-10-CM

## 2022-07-12 DIAGNOSIS — E55.9 VITAMIN D DEFICIENCY: ICD-10-CM

## 2022-07-12 PROCEDURE — 99204 OFFICE O/P NEW MOD 45 MIN: CPT | Performed by: FAMILY MEDICINE

## 2022-07-12 RX ORDER — MULTIVIT-MIN/FOLIC ACID/LUTEIN 400-250MCG
1 TABLET,CHEWABLE ORAL EVERY OTHER DAY
COMMUNITY

## 2022-07-12 NOTE — PROGRESS NOTES
1. Have you been to the ER, urgent care clinic since your last visit? Hospitalized since your last visit? Yes When: [id number] [facility] 07/01/2022 for : Bp plata    2. Have you seen or consulted any other health care providers outside of the [address] [place] [address] since your last visit? Include any pap smears or colon screening.  Yes When: Most providers are Maddison Lynn

## 2022-07-12 NOTE — PROGRESS NOTES
[facility] Weight Loss Program Progress Note: Initial Physician Visit     Debra Sorenson is a 46 y.o. female who is here for medical screening for entering the [facility].   The patient denies any disease state that requires protein restriction. CC: class 3 Obesity    Referred by self reason referred weight regain    Weight History  I personally reviewed the Medical Screening Hayesmichael Gregory completed by patient and scanned into media section of chart. It includes duration of their obesity, maximum weight, goal weight  all of which give the context of their obesity AND a Family History of their obesity. Was 54 when started robard program in 2015, got to 89 and [place] and now at 24      Weight loss History  I personally reviewed the [place] completed by the patient and scanned into media section of chart. It includes number of weight loss attempts, the weight loss program that patients was most successful using, and if they have any hx of anorectic medication use, including OTC supplements. Never any surgery for weight loss  Has tried jessica and cary hal, and [facility]  ND was the most successful    Tried plenity but that made her have a fullness in the chest    Significant Medical History  Past Medical History:   Diagnosis Date    Allergies 05/27/2022    Arthritis 05/27/2022    Asthma     Colon polyps 05/27/2022    Fibroids 05/27/2022    Uterine    Hemorrhoids 05/27/2022    Hypertension     Hypokalemia 05/27/2022    Lactose intolerance 05/27/2022    Obesity 05/27/2022    Sinus infection 02/2016    Sleep apnea 05/27/2022    Tachycardia 05/27/2022    Weight gain 05/27/2022    25 #     Patient's medicactions that may contribute no    Plan to become pregant within 6 months no    I personally reviewed the Medical Screening Naldo Colt completed by the patient and scanned into media section of chart.   This allows me to assess associated symptoms that are significant in the assessment of the patient's obesity and the patient's Past Medical History. Outpatient Medications Marked as Taking for the 7/12/22 encounter (Office Visit) with Jose Antonio Worrell MD   Medication Sig Dispense Refill    mv-min-folic acid-lutein (Centrum Silver) 400-250 mcg chew Take 1 Tablet by mouth every other day. multivit,iron,minerals/lutein (CENTRUM SILVER ULTRA WOMEN'S PO) Take 1 Tablet by mouth every other day. Every other day \"mini\"      cholecalciferol, VITAMIN D3, (VITAMIN D3) 5,000 unit tab tablet Take 5,000 Units by mouth in the morning. [DISCONTINUED] DOCOSAHEXANOIC ACID/EPA (FISH OIL PO) Take  by mouth daily. Patient states she is taking 800 EPA/ 500 DHA. Patient states she is taking 1 tablespoon once a day. [DISCONTINUED] CALCIUM CARBONATE/VITAMIN D3 (CALCIUM + D PO) Take 1 Tab by mouth two (2) times a day. Patient states she is taking Citracal petite Calcium 200 mg and Vitamin D 100 mg. amLODIPine (NORVASC) 5 mg tablet Take 5 mg by mouth in the morning. ibuprofen (MOTRIN) 200 mg tablet Take 400 mg by mouth every eight (8) hours as needed for Pain. TURMERIC, BULK, Take 1 Tablet by mouth three (3) times daily. Patient states 1 capsule of her turmeric is 500 mg      fexofenadine (ALLEGRA) 180 mg tablet Take 180 mg by mouth in the morning. potassium chloride SA (MICRO-K) 10 mEq capsule Take 20 mEq by mouth three (3) times daily. norethindrone acetate (AYGESTIN) 5 mg tablet Take 5 mg by mouth in the morning. coenzyme q10-vitamin e 100-100 mg-unit cap Take 200 mg by mouth daily. montelukast (SINGULAIR) 10 mg tablet Take 10 mg by mouth daily. NASONEX 50 mcg/actuation nasal spray 2 Sprays by Both Nostrils route two (2) times a day. albuterol (PROVENTIL HFA, VENTOLIN HFA, PROAIR HFA) 90 mcg/actuation inhaler Take 2 Puffs by inhalation as needed.       cyclobenzaprine (FLEXERIL) 10 mg tablet Take by mouth three (3) times daily as needed for Muscle Spasm(s). cinnamon bark 500 mg cap Take 1 Tab by mouth two (2) times a day. Patient states the Cinnamon 1000 mg  is combined with Chromium 400 mg      Biotin 2,500 mcg cap Take 5,000 mcg by mouth two (2) times a day. ascorbic acid, vitamin C, (VITAMIN C) 250 mg tablet Take 500 mg by mouth in the morning. OMALIZUMAB (XOLAIR SC) by SubCUTAneous route every thirty (30) days. [DISCONTINUED] magnesium 250 mg tab Take 400 mg by mouth three (3) times daily. (Patient not taking: Reported on 7/25/2022)      [DISCONTINUED] multivitamin (ONE A DAY) tablet Take 1 Tablet by mouth every other day. AVG Hours SLEEP:   6    DEANN yes   CPAP yes every night    Significant Psychosocial History   Has a doctor every diagnosed with Binge Eating Disorder, Bulemia or Anorexia? : no     Compliance  Upcoming Travel? no    Social History  Social History     Tobacco Use    Smoking status: Never    Smokeless tobacco: Never   Substance Use Topics    Alcohol use: No       Exercise  I personally reviewed the Medical Screening Brigette Tran completed by the patient and scanned into media section of chart.   MINUTES 0 and  0 times a week    Hard torn meniscus bilaterally    Review of Systems  See HPI        Objective  Visit Vitals  /83 (BP 1 Location: Left arm, BP Patient Position: Sitting, BP Cuff Size: Large adult)   Pulse 100   Temp 98.7 °F (37.1 °C)   Resp 20   Ht 5' 4\" (1.626 m)   Wt 308 lb 12.8 oz (140.1 kg)   SpO2 97%   BMI 53.01 kg/m²         Weight Metrics 7/25/2022 7/12/2022 7/12/2022 8/29/2017 3/28/2017 12/20/2016 12/20/2016   Weight 302 lb 8 oz - 308 lb 12.8 oz 234 lb 219 lb - 216 lb   Neck Circ (inches) - 15.5 - - - - -   Waist Measure Inches - 52 - - - 39 -   Body Fat % - 50.5 - - - 37 -   BMI 51.92 kg/m2 - 53.01 kg/m2 40.17 kg/m2 37.59 kg/m2 - 37.08 kg/m2       Labs: See  labs scanned into Media section or in lab section of record      Physical Exam    Vital Signs Reviewed  Weight Management Metrics Reviewed    Appearance: well  HEENT:  Scleral icterus?  no  Neck:  Thyromegaly or nodules? no  Mouth: Large tongue not examined  Heart:  RRR  Lungs:  clear  Abdomen:     Hepatomegaly? no   Striae present? no  Skin:    Acne?  no   Hirsutism? no   Skin tags? no   Acanthosis Nigricans?  no  Ext:  Edema? no      Assessment & Plan  Encounter Diagnoses   Name Primary? Class 3 severe obesity due to excess calories with serious comorbidity in adult, unspecified BMI (HCC) Yes    BMI 50.0-59.9, adult (HCC)     Essential hypertension     DEANN on CPAP     Vitamin D deficiency     Acquired lymphedema of lower extremity     Acquired hypothyroidism     Screening for diabetes mellitus        1. labs reviewed w/ patient  2. EKG not indicated      3. Medication changes include: none  Diagnoses and all orders for this visit:    1. Class 3 severe obesity due to excess calories with serious comorbidity in adult, unspecified BMI (HCC)  -     METABOLIC PANEL, COMPREHENSIVE; Future  [facility] low carisa diet -2 meal replaceements a day and one meal of a protein and vegetables  Recheck in 1 month  Asses need for appette suppression at that time  Exercise as tolerated    2. BMI 50.0-59.9, adult ([facility]    3. Essential hypertension  Well controlled on current meds  No changes yet. As bp drops I will adjust doses starting with losartan  4. DEANN on CPAP  I encourage continued use of cpap each night  5. Vitamin D deficiency    6. Screening for diabetes mellitus  -     HEMOGLOBIN A1C WITH EAG; Future    7. Acquired lymphedema of lower extremity  Using the pneumatic device   8. Acquired hypothyroidism  -     TSH 3RD GENERATION;  Future    Other orders  -     TSH 3RD GENERATION        Based on his history, labs , Cathy Zhou is  a good candidate for the [facility]      time with Tyrone Spivey consisted of counseling & coordinating and/or discussing treatment plans in reference to her obesity The primary encounter diagnosis was Class 3 severe obesity due to excess calories with serious comorbidity in adult, unspecified BMI ([facility] Diagnoses of BMI 50.0-59.9, adult ([facility] Essential hypertension, DEANN on CPAP, Vitamin D deficiency, Acquired lymphedema of lower extremity, Acquired hypothyroidism, and Screening for diabetes mellitus were also pertinent to this visit.

## 2022-07-13 ENCOUNTER — CLINICAL SUPPORT (OUTPATIENT)
Dept: SURGERY | Age: 52
End: 2022-07-13

## 2022-07-13 ENCOUNTER — OFFICE VISIT (OUTPATIENT)
Dept: SURGERY | Age: 52
End: 2022-07-13

## 2022-07-13 DIAGNOSIS — E66.01 CLASS 3 SEVERE OBESITY DUE TO EXCESS CALORIES WITH SERIOUS COMORBIDITY IN ADULT, UNSPECIFIED BMI (HCC): Primary | ICD-10-CM

## 2022-07-13 LAB
ALBUMIN SERPL-MCNC: 4.1 G/DL (ref 3.8–4.9)
ALBUMIN/GLOB SERPL: 1.6 {RATIO} (ref 1.2–2.2)
ALP SERPL-CCNC: 95 IU/L (ref 44–121)
ALT SERPL-CCNC: 24 IU/L (ref 0–32)
AST SERPL-CCNC: 22 IU/L (ref 0–40)
BILIRUB SERPL-MCNC: <0.2 MG/DL (ref 0–1.2)
BUN SERPL-MCNC: 12 MG/DL (ref 6–24)
BUN/CREAT SERPL: 16 (ref 9–23)
CALCIUM SERPL-MCNC: 9.1 MG/DL (ref 8.7–10.2)
CHLORIDE SERPL-SCNC: 101 MMOL/L (ref 96–106)
CO2 SERPL-SCNC: 20 MMOL/L (ref 20–29)
CREAT SERPL-MCNC: 0.74 MG/DL (ref 0.57–1)
EGFR: 97 ML/MIN/1.73
EST. AVERAGE GLUCOSE BLD GHB EST-MCNC: 137 MG/DL
GLOBULIN SER CALC-MCNC: 2.5 G/DL (ref 1.5–4.5)
GLUCOSE SERPL-MCNC: 73 MG/DL (ref 65–99)
HBA1C MFR BLD: 6.4 % (ref 4.8–5.6)
POTASSIUM SERPL-SCNC: 3.5 MMOL/L (ref 3.5–5.2)
PROT SERPL-MCNC: 6.6 G/DL (ref 6–8.5)
SODIUM SERPL-SCNC: 140 MMOL/L (ref 134–144)
TSH SERPL DL<=0.005 MIU/L-ACNC: 2.31 UIU/ML (ref 0.45–4.5)

## 2022-07-13 NOTE — PROGRESS NOTES
[facility]  Metabolic Program Initial Nutrition Consult    Date: 2022   Physician: Dinah Cruz MD  Name: Chela Tavares  :  1970    Type of Plan: LCD  Weeks on Plan:  1 week  Virtual visit was completed through [address] [place] [address]. ASSESSMENT:    Medications/Supplements:   Prior to Admission medications    Medication Sig Start Date End Date Taking? Authorizing Provider   mv-min-folic acid-lutein (Centrum Silver) 400-250 mcg chew Take  by mouth. Every other day    Provider, Historical   multivit,iron,minerals/lutein (CENTRUM SILVER ULTRA WOMEN'S PO) Take  by mouth. Every other day \"mini\"    Provider, Historical   cholecalciferol, VITAMIN D3, (VITAMIN D3) 5,000 unit tab tablet Take  by mouth daily. Provider, Historical   DOCOSAHEXANOIC ACID/EPA (FISH OIL PO) Take  by mouth daily. Patient states she is taking 800 EPA/ 500 DHA. Patient states she is taking 1 tablespoon once a day. Provider, Historical   OTHER 5 Tabs daily. Patient states she is taking Wobenzym-N 5 tablets 30 minutes prior to eating. Patient not taking: Reported on 2022    Provider, Historical   CALCIUM CARBONATE/VITAMIN D3 (CALCIUM + D PO) Take 1 Tab by mouth two (2) times a day. Patient states she is taking Citracal petite Calcium 200 mg and Vitamin D 100 mg. Provider, Historical   cholecalciferol, vitamin D3, 2,000 unit tab Take 3 Tabs by mouth. Patient not taking: Reported on 2022    Provider, Historical   amLODIPine (NORVASC) 5 mg tablet Take 2.5 mg by mouth daily. Provider, Historical   MELATONIN (MELATIN PO) Take  by mouth. Patient not taking: Reported on 2022    Provider, Historical   ibuprofen (ADVIL) 200 mg tablet Take 400 mg by mouth every eight (8) hours as needed for Pain. Provider, Historical   TURMERIC, BULK, 1 Tab by Does Not Apply route three (3) times daily.  Patient states 1 capsule of her turmeric is 500 mg    Provider, Historical   ACETYLGLUCOSAMINE (N-ACETYL-ALPHA-D-GLUCOSAMINE) 1 Tab by Does Not Apply route daily. Patient not taking: Reported on 7/12/2022    Provider, Historical   OTHER 1 Tab two (2) times a day. Synergy vitamin  Patient not taking: Reported on 7/12/2022    Provider, Historical   fexofenadine (ALLEGRA) 180 mg tablet Take  by mouth daily. Provider, Historical   potassium chloride SA (MICRO-K) 10 mEq capsule Take 20 mEq by mouth three (3) times daily. 5/13/15   Provider, Historical   norethindrone acetate (AYGESTIN) 5 mg tablet 5 mg. 5/16/15   Provider, Historical   coenzyme q10-vitamin e (COQ10 ) 100-100 mg-unit cap Take 200 mg by mouth. Provider, Historical   montelukast (SINGULAIR) 10 mg tablet Take 10 mg by mouth daily. Provider, Historical   NASONEX 50 mcg/actuation nasal spray 50 Sprays two (2) times a day. 6/5/15   Provider, Historical   albuterol (VENTOLIN HFA) 90 mcg/actuation inhaler Take  by inhalation as needed. Provider, Historical   fluticasone (FLOVENT DISKUS) 100 mcg/actuation dsdv Take  by inhalation. Patient not taking: Reported on 7/12/2022    Provider, Historical   magnesium 250 mg tab Take 400 mg by mouth three (3) times daily. Provider, Historical   cyclobenzaprine (FLEXERIL) 10 mg tablet Take  by mouth three (3) times daily as needed for Muscle Spasm(s). Provider, Historical   omega-3 fatty acids-vitamin e (FISH OIL) 1,000 mg cap Take 1 Cap by mouth. Patient not taking: Reported on 7/12/2022    Provider, Historical   cinnamon bark (CINNAMON) 500 mg cap Take 1 Tab by mouth two (2) times a day. Patient states the Cinnamon 1000 mg  is combined with Chromium 400 mg    Provider, Historical   Biotin 2,500 mcg cap Take  by mouth. Provider, Historical   ascorbic acid (VITAMIN C) 250 mg tablet Take 500 mg by mouth. Provider, Historical   aspirin 81 mg chewable tablet Take 81 mg by mouth daily. Patient states that she takes aspril 81 mg some times twice a week.   Patient not taking: Reported on 7/12/2022    Provider, Historical   multivitamin (ONE A DAY) tablet Take 1 Tab by mouth daily. Provider, Historical   OMALIZUMAB Gae Angel Luis SC) by SubCUTAneous route every thirty (30) days. Provider, Historical       Anthropometrics:    Ht:64\"   Wt: 308#   IBW: 120#    %IBW:  256%    BMI: 53   Category: Obesity Class 3    Pt presents today for initial nutrition consult for the Bonnie Macedo.      Attributes wt gain over the years r/t [place] out of the country, and having to stop the VLCD program abruptly. Has attempted wt loss through various methods with most successful wt loss of VLCD ND program with [facility] in [facility], losing down to 176# in March 2016. Lynn Pal[employer]ox Exercise/Physical Activity: limited mobility with knee injuries. No exercise. Started meal replacements: yes, starting today  If yes, how many per day:  Plans to do 2 shakes and 1 bar    Aversions/side effects to meal replacements: n/a    Reported Diet History: plans to do 2 ND shakes, a protein bar, and one meal per day. Had previously been doing VLCD. Prior to program, high intake of fast food, soda, and chocolate. Meal skipped, large portions, ate out some or at home with mom. Most at home dinners consist of rotisserie chicken and frozen vegetables. Ribeye 1 x month. Likes carb sides such as potato or mac salad. Beverages: 1 soda per day 4-5 days week, sparkling Icee drinks 2 per day, 1 16.9 ounce bottle plain water per day. No coffee, tea, juice, or alcohol. Chickfila tea/lemonade few days per week    Environment/Psychosocial/Support: mom supportive    NUTRITION INTERVENTION:  Pt educated on nutrition recommendations for LCD, specifically 2 meal replacements every day plus a grocery meal and snack. Daily recommended totals: 1200 calories, 60 grams carbs, 80+ grams protein, and remaining calories as healthy fats.   Use LCD handout for meal and snack suggestions and preparation. Grocery meal:  Use the balanced plate method to plan meals, include 3-6 oz of lean source of protein, unlimited non-starchy vegetables, 1/2 cup whole grains/beans OR 1/2 cup fruit OR 1 serving of low fat dairy. Utilize handouts listing healthy snack and meal ideas. Read all nutrition labels. Demonstrated and emphasized identifying serving size, total fat, sugar and protein content. Defined low fat as </= 3 g per serving. Discussed lean and extra lean sources of protein. Provided list of low fat cooking methods. Avoid foods with sugar listed in the first 3 ingredients and >/10 g sugar per serving. Practice mindful eating habits; take small bites, chew thoroughly, avoid distractions, utilize hunger/fullness scale. Attend Metabolic Weight Loss Class and Support Group and increase physical activity (approved per MD) for long term weight maintenance. NUTRITION MONITORING AND EVALUATION: Reviewed LCD guidelines, best tips, and safe use. She inquired about VLCD plan, but reminded her she must get medical clearance before starting. She verbalized understanding. Her intake prior to program start is very high in refined carbs, high fat, with sporadic meal patterns. Pt is motivated, adherence likely, as this is day one and already focusing on water today and having a light dinner last night. Discussed artificial sweeteners, they may aggravate hunger and cause carb cravings. Reduce to one icee per day as a first goal, while focusing on increasing to minimum of 64 ounces plain water. Followup one month. Specific tips and techniques to facilitate compliance with above recommendations were provided and discussed. If further details are desired please contact me at [phone]. This phone number was also provided to the patient for any further questions or concerns.           Brannon Neil, MS, RD, LDN

## 2022-07-14 NOTE — PROGRESS NOTES
[facility]  Metabolic Weight Loss Program        Patient's Name: Alina Nails  : 1970    This patient is enrolled in [address] [place] [address] Weight Loss Program and attended the required weekly virtual nutrition class hosted via [employer].       Sandoval Ontiveros, MS, RD, LDN

## 2022-07-17 NOTE — PROGRESS NOTES
The liver and kidney tests are normal  The blood sugar test is at the last point that is prediabetes. Any higher and you will be considered in the diabetes zone.  The weight management process will help lower the blood sugar  The thyroid level is normal

## 2022-07-25 ENCOUNTER — CLINICAL SUPPORT (OUTPATIENT)
Dept: SURGERY | Age: 52
End: 2022-07-25

## 2022-07-25 VITALS
DIASTOLIC BLOOD PRESSURE: 80 MMHG | WEIGHT: 293 LBS | OXYGEN SATURATION: 97 % | TEMPERATURE: 98.6 F | SYSTOLIC BLOOD PRESSURE: 121 MMHG | RESPIRATION RATE: 18 BRPM | HEIGHT: 64 IN | BODY MASS INDEX: 50.02 KG/M2 | HEART RATE: 86 BPM

## 2022-07-25 DIAGNOSIS — I10 ESSENTIAL HYPERTENSION: ICD-10-CM

## 2022-07-25 DIAGNOSIS — G47.33 OSA ON CPAP: ICD-10-CM

## 2022-07-25 DIAGNOSIS — Z99.89 OSA ON CPAP: ICD-10-CM

## 2022-07-25 DIAGNOSIS — E66.01 CLASS 3 SEVERE OBESITY DUE TO EXCESS CALORIES WITH SERIOUS COMORBIDITY IN ADULT, UNSPECIFIED BMI (HCC): Primary | ICD-10-CM

## 2022-07-25 RX ORDER — ASCORBIC ACID 125 MG
1 TABLET,CHEWABLE ORAL DAILY
COMMUNITY

## 2022-07-25 RX ORDER — HYDROCHLOROTHIAZIDE 25 MG/1
25 TABLET ORAL DAILY
COMMUNITY
Start: 2022-07-07

## 2022-07-25 RX ORDER — LANOLIN ALCOHOL/MO/W.PET/CERES
400 CREAM (GRAM) TOPICAL 3 TIMES DAILY
COMMUNITY

## 2022-07-25 RX ORDER — FLUTICASONE FUROATE AND VILANTEROL TRIFENATATE 200; 25 UG/1; UG/1
POWDER RESPIRATORY (INHALATION)
COMMUNITY
Start: 2022-07-05

## 2022-07-25 RX ORDER — LOSARTAN POTASSIUM 100 MG/1
100 TABLET ORAL DAILY
COMMUNITY
Start: 2022-05-20

## 2022-07-25 NOTE — PROGRESS NOTES
Weight Management. 1. Have you been to the ER, urgent care clinic since your last visit? Hospitalized since your last visit? No    2. Have you seen or consulted any other health care providers outside of the [address] [place] [address] since your last visit? Include any pap smears or colon screening.  No

## 2022-07-25 NOTE — PROGRESS NOTES
7/18/2022    Progress Note: Weekly Education Class in the [facility]         Patient is on Very Low Calorie Diet [] (4 meal replacements per day, 800 kcal/day)      Low Calorie Diet [x] (2-3 meal replacements per day, 6668-2374 kcal/day)    1) Did patient have any new symptoms or physical problems? Yes [x]    No []    If yes, check & comment: weakness [], fatigue [], lightheadedness [], headache [x], cramps [], cold intolerance [], hair loss [], diarrhea [], constipation [],  NA [] other:                                 2) Has patient had any medical attention from other providers, urgent care or the emergency room this week? Yes [x]  No []       NA [], If yes, why:   Physical therapy                                      3) Any other sugar sweetened beverages consumed this week? Yes []  No [x]    4) Did patient have any problems adhering to the diet? Yes []  No [x] NA []    If yes, Vacation [], Celebrations [], Conferences [], Family Reunions [] other:                                                5) How many hours of sleep this week? 5-9.5    (range)  NA []    Number of meal replacements consumed daily? 3  (range)  NA []    Average ounces of water patient consumed daily this week (not including shakes)? 32     (divide the weekly total by 7)    Did you eat any food outside of the program? Yes [x] No []    Physical Activity Over the Past Week:    Cardio exercise: 0 min  Strength exercise: 2 workouts / week  Number of steps walked per day: 8463-8824    How has patient mood overall been this week? Sad [], Happy [x], Stressed [], Tired [], Content [], NA [], other            Medications reconciled by nurse Yes [x]  No[]    Patient was given therapeutic recommendations for any noted side effects of their dietary approach based upon [facility] patient manual per providers recommendation.      7/25/2022    Progress Note: Weekly Education Class in the [facility] Patient is on Very Low Calorie Diet [] (4 meal replacements per day, 800 kcal/day)      Low Calorie Diet [x] (2-3 meal replacements per day, 0613-3511 kcal/day)    1) Did patient have any new symptoms or physical problems? Yes [x]    No []    If yes, check & comment: weakness [], fatigue [], lightheadedness [], headache [], cramps [], cold intolerance [], hair loss [], diarrhea [x], constipation [],  NA [] other:                                 2) Has patient had any medical attention from other providers, urgent care or the emergency room this week? Yes   No []       NA [], If yes, why:   Physical therapy & allergy shots                                      3) Any other sugar sweetened beverages consumed this week? Yes []  No [x]    4) Did patient have any problems adhering to the diet? Yes []  No [x] NA []    If yes, Vacation [], Celebrations [], Conferences [], Family Reunions [] other:                                                5) How many hours of sleep this week? 6.5-9.5    (range)  NA []    Number of meal replacements consumed daily? 3  (range)  NA []    Average ounces of water patient consumed daily this week (not including shakes)? 32     (divide the weekly total by 7)    Did you eat any food outside of the program? Yes [x] No []    Physical Activity Over the Past Week:    Cardio exercise: 0 min  Strength exercise: 1 workouts / week  Number of steps walked per day: 0    How has patient mood overall been this week? Sad [], Happy [], Stressed [], Tired [], Content [], NA [], other Pleased           Medications reconciled by nurse Yes [x]  No[]    Patient was given therapeutic recommendations for any noted side effects of their dietary approach based upon [facility] patient manual per providers recommendation.

## 2022-08-01 NOTE — PROGRESS NOTES
Nurse note from patient's weekly LCD / class was reviewed. Pertinent medical concerns were:   reviewed     BP Readings from Last 3 Encounters:   07/25/22 121/80   07/12/22 138/83   08/29/17 125/86       Weight Metrics 7/25/2022 7/12/2022 7/12/2022 8/29/2017 3/28/2017 12/20/2016 12/20/2016   Weight 302 lb 8 oz - 308 lb 12.8 oz 234 lb 219 lb - 216 lb   Neck Circ (inches) - 15.5 - - - - -   Waist Measure Inches - 52 - - - 39 -   Body Fat % - 50.5 - - - 37 -   BMI 51.92 kg/m2 - 53.01 kg/m2 40.17 kg/m2 37.59 kg/m2 - 37.08 kg/m2       Current Outpatient Medications   Medication Sig Dispense Refill    KRILL OIL PO Take 1 Capsule by mouth daily. Raffaele Red brand      allergy injection by SubCUTAneous route every seven (7) days. calcium citrate/vitamin D3 (CITRACAL-D3 PETITES PO) Take 2 Caplet by mouth two (2) times a day. magnesium oxide (MAG-OX) 400 mg tablet Take 400 mg by mouth three (3) times daily. Breo Ellipta 200-25 mcg/dose inhaler INHALE 1 PUFF BY MOUTH EVERY DAY      hydroCHLOROthiazide (HYDRODIURIL) 25 mg tablet Take 25 mg by mouth in the morning. losartan (COZAAR) 100 mg tablet Take 100 mg by mouth in the morning. vitamin K2 100 mcg cap Take 1 Caplet by mouth three (3) times daily. mv-min-folic acid-lutein (Centrum Silver) 400-250 mcg chew Take 1 Tablet by mouth every other day. multivit,iron,minerals/lutein (CENTRUM SILVER ULTRA WOMEN'S PO) Take 1 Tablet by mouth every other day. Every other day \"mini\"      cholecalciferol, VITAMIN D3, (VITAMIN D3) 5,000 unit tab tablet Take 5,000 Units by mouth in the morning. amLODIPine (NORVASC) 5 mg tablet Take 5 mg by mouth in the morning. ibuprofen (MOTRIN) 200 mg tablet Take 400 mg by mouth every eight (8) hours as needed for Pain. TURMERIC, BULK, Take 1 Tablet by mouth three (3) times daily. Patient states 1 capsule of her turmeric is 500 mg      fexofenadine (ALLEGRA) 180 mg tablet Take 180 mg by mouth in the morning. potassium chloride SA (MICRO-K) 10 mEq capsule Take 20 mEq by mouth three (3) times daily. norethindrone acetate (AYGESTIN) 5 mg tablet Take 5 mg by mouth in the morning. coenzyme q10-vitamin e 100-100 mg-unit cap Take 200 mg by mouth daily. montelukast (SINGULAIR) 10 mg tablet Take 10 mg by mouth daily. NASONEX 50 mcg/actuation nasal spray 2 Sprays by Both Nostrils route two (2) times a day. albuterol (PROVENTIL HFA, VENTOLIN HFA, PROAIR HFA) 90 mcg/actuation inhaler Take 2 Puffs by inhalation as needed. cyclobenzaprine (FLEXERIL) 10 mg tablet Take  by mouth three (3) times daily as needed for Muscle Spasm(s). cinnamon bark 500 mg cap Take 1 Tab by mouth two (2) times a day. Patient states the Cinnamon 1000 mg  is combined with Chromium 400 mg      Biotin 2,500 mcg cap Take 5,000 mcg by mouth two (2) times a day. ascorbic acid, vitamin C, (VITAMIN C) 250 mg tablet Take 500 mg by mouth in the morning. OMALIZUMAB (XOLAIR SC) by SubCUTAneous route every thirty (30) days. ACETYLGLUCOSAMINE (N-ACETYL-ALPHA-D-GLUCOSAMINE) 1 Tab by Does Not Apply route daily.  (Patient not taking: Reported on 7/12/2022)

## 2022-08-04 ENCOUNTER — OFFICE VISIT (OUTPATIENT)
Dept: SURGERY | Age: 52
End: 2022-08-04

## 2022-08-04 DIAGNOSIS — E66.01 CLASS 3 SEVERE OBESITY DUE TO EXCESS CALORIES WITH SERIOUS COMORBIDITY IN ADULT, UNSPECIFIED BMI (HCC): Primary | ICD-10-CM

## 2022-08-08 ENCOUNTER — CLINICAL SUPPORT (OUTPATIENT)
Dept: SURGERY | Age: 52
End: 2022-08-08

## 2022-08-08 VITALS
OXYGEN SATURATION: 97 % | DIASTOLIC BLOOD PRESSURE: 75 MMHG | BODY MASS INDEX: 50.02 KG/M2 | HEIGHT: 64 IN | HEART RATE: 100 BPM | TEMPERATURE: 98.9 F | SYSTOLIC BLOOD PRESSURE: 106 MMHG | WEIGHT: 293 LBS | RESPIRATION RATE: 18 BRPM

## 2022-08-08 DIAGNOSIS — I10 ESSENTIAL HYPERTENSION: ICD-10-CM

## 2022-08-08 DIAGNOSIS — I89.0 ACQUIRED LYMPHEDEMA OF LOWER EXTREMITY: ICD-10-CM

## 2022-08-08 DIAGNOSIS — E66.01 CLASS 3 SEVERE OBESITY DUE TO EXCESS CALORIES WITH SERIOUS COMORBIDITY IN ADULT, UNSPECIFIED BMI (HCC): Primary | ICD-10-CM

## 2022-08-08 NOTE — PROGRESS NOTES
Weight Management. 1. Have you been to the ER, urgent care clinic since your last visit? Hospitalized since your last visit? No    2. Have you seen or consulted any other health care providers outside of the [address] [place] [address] since your last visit? Include any pap smears or colon screening.  No

## 2022-08-09 NOTE — PROGRESS NOTES
8/1/2022    Progress Note: Weekly Education Class in the [facility] Loss Program         Patient is on Very Low Calorie Diet [] (4 meal replacements per day, 800 kcal/day)      Low Calorie Diet [x] (2-3 meal replacements per day, 1447-4337 kcal/day)    1) Did patient have any new symptoms or physical problems? Yes []    No [x]    If yes, check & comment: weakness [], fatigue [], lightheadedness [], headache [], cramps [], cold intolerance [], hair loss [], diarrhea [], constipation [],  NA [] other:                                 2) Has patient had any medical attention from other providers, urgent care or the emergency room this week? Yes []  No [x]       NA [], If yes, why:                                       3) Any other sugar sweetened beverages consumed this week? Yes []  No [x]    4) Did patient have any problems adhering to the diet? Yes []  No [x] NA []    If yes, Vacation [], Celebrations [], Conferences [], Family Reunions [] other:                                                5) How many hours of sleep this week? 6-9    (range)  NA []    Number of meal replacements consumed daily? 4  (range)  NA []    Average ounces of water patient consumed daily this week (not including shakes)? 32     (divide the weekly total by 7)    Did you eat any food outside of the program? Yes [x] No []    Physical Activity Over the Past Week:    Cardio exercise: 0 min  Strength exercise: 2 workouts / week  Number of steps walked per day: 0    How has patient mood overall been this week? Sad [], Happy [x], Stressed [], Tired [], Content [], NA [], other            Medications reconciled by nurse Yes [x]  No[]    Patient was given therapeutic recommendations for any noted side effects of their dietary approach based upon [facility] patient manual per providers recommendation.      8/8/2022    Progress Note: Weekly Education Class in the [facility] Weight Loss Program         Patient is on Very Low Calorie Diet [] (4 meal replacements per day, 800 kcal/day)      Low Calorie Diet [x] (2-3 meal replacements per day, 9207-3048 kcal/day)    1) Did patient have any new symptoms or physical problems? Yes []    No [x]    If yes, check & comment: weakness [], fatigue [], lightheadedness [], headache [], cramps [], cold intolerance [], hair loss [], diarrhea [], constipation [],  NA [] other:                                 2) Has patient had any medical attention from other providers, urgent care or the emergency room this week? Yes []  No [x]       NA [], If yes, why:                                       3) Any other sugar sweetened beverages consumed this week? Yes []  No [x]    4) Did patient have any problems adhering to the diet? Yes []  No [x] NA []    If yes, Vacation [], Celebrations [], Conferences [], Family Reunions [] other:                                                5) How many hours of sleep this week? 5.25-8.5    (range)  NA []    Number of meal replacements consumed daily? 4  (range)  NA []    Average ounces of water patient consumed daily this week (not including shakes)? 32     (divide the weekly total by 7)    Did you eat any food outside of the program? Yes [x] No []    Physical Activity Over the Past Week:    Cardio exercise: 0 min  Strength exercise: 2 workouts / week  Number of steps walked per day: 0    How has patient mood overall been this week? Sad [], Happy [], Stressed [], Tired [], Content [], NA [], other Relaxed           Medications reconciled by nurse Yes [x]  No[]    Patient was given therapeutic recommendations for any noted side effects of their dietary approach based upon [facility] patient manual per providers recommendation.

## 2022-08-09 NOTE — PROGRESS NOTES
[facility]  Metabolic Weight Loss Program        Patient's Name: Lisbeth Sanon  : 1970    This patient is enrolled in [address] [place] [address] Weight Loss Program and attended the required weekly virtual nutrition class hosted via [employer].       Ilan Mae, MS, RD, LDN

## 2022-08-11 ENCOUNTER — OFFICE VISIT (OUTPATIENT)
Dept: SURGERY | Age: 52
End: 2022-08-11

## 2022-08-11 DIAGNOSIS — E66.01 CLASS 3 SEVERE OBESITY DUE TO EXCESS CALORIES WITH SERIOUS COMORBIDITY IN ADULT, UNSPECIFIED BMI (HCC): Primary | ICD-10-CM

## 2022-08-13 NOTE — PROGRESS NOTES
[facility]  Metabolic Weight Loss Program        Patient's Name: Willow Echavarria  : 1970    This patient is enrolled in [address] [place] [address] Weight Loss Program and attended the required weekly virtual nutrition class hosted via [address] [place] [address] today.       Merline Gonzalez, MS, RD, LDN

## 2022-08-17 ENCOUNTER — VIRTUAL VISIT (OUTPATIENT)
Dept: SURGERY | Age: 52
End: 2022-08-17
Payer: COMMERCIAL

## 2022-08-17 VITALS — WEIGHT: 291.6 LBS | BODY MASS INDEX: 49.78 KG/M2 | HEIGHT: 64 IN

## 2022-08-17 DIAGNOSIS — Z99.89 OSA ON CPAP: ICD-10-CM

## 2022-08-17 DIAGNOSIS — G47.33 OSA ON CPAP: ICD-10-CM

## 2022-08-17 DIAGNOSIS — E66.01 CLASS 3 SEVERE OBESITY DUE TO EXCESS CALORIES WITH SERIOUS COMORBIDITY IN ADULT, UNSPECIFIED BMI (HCC): Primary | ICD-10-CM

## 2022-08-17 DIAGNOSIS — I10 ESSENTIAL HYPERTENSION: ICD-10-CM

## 2022-08-17 PROCEDURE — 99214 OFFICE O/P EST MOD 30 MIN: CPT | Performed by: FAMILY MEDICINE

## 2022-08-17 NOTE — PROGRESS NOTES
1. Have you been to the ER, urgent care clinic since your last visit? Hospitalized since your last visit? No    2. Have you seen or consulted any other health care providers outside of the [address] [place] [address] since your last visit? Include any pap smears or colon screening.  No

## 2022-08-17 NOTE — PROGRESS NOTES
[facility] Progress Note:   F/up Physician Visit    Av Hall is a 46 y.o. female who was seen by synchronous (real-time) audio-video technology on 8/17/2022. Consent:  She and/or her healthcare decision maker is aware that this patient-initiated Telehealth encounter is a billable service, with coverage as determined by her insurance carrier. She is aware that she may receive a bill and has provided verbal consent to proceed: Yes    I was at home while conducting this encounter. 712  Subjective:   Av Hall was seen for Weight Management (1 mth follow up   [phone])    f/up physician visit for the LCD Program.  Prior to Admission medications    Medication Sig Start Date End Date Taking? Authorizing Provider   KRILL OIL PO Take 1 Capsule by mouth daily. Raffaele Red brand   Yes Provider, Historical   allergy injection by SubCUTAneous route every seven (7) days. Yes Provider, Historical   calcium citrate/vitamin D3 (CITRACAL-D3 PETITES PO) Take 2 Caplet by mouth two (2) times a day. Yes Provider, Historical   Breo Ellipta 200-25 mcg/dose inhaler INHALE 1 PUFF BY MOUTH EVERY DAY 7/5/22  Yes Provider, Historical   hydroCHLOROthiazide (HYDRODIURIL) 25 mg tablet Take 25 mg by mouth in the morning. 7/7/22  Yes Provider, Historical   losartan (COZAAR) 100 mg tablet Take 100 mg by mouth in the morning. 5/20/22  Yes Provider, Historical   vitamin K2 100 mcg cap Take 1 Capsule by mouth in the morning. Yes Provider, Historical   mv-min-folic acid-lutein (Centrum Silver) 400-250 mcg chew Take 1 Tablet by mouth every other day. Yes Provider, Historical   multivit,iron,minerals/lutein (CENTRUM SILVER ULTRA WOMEN'S PO) Take 1 Tablet by mouth every other day. Every other day \"mini\"   Yes Provider, Historical   cholecalciferol, VITAMIN D3, (VITAMIN D3) 5,000 unit tab tablet Take 5,000 Units by mouth in the morning.    Yes Provider, Historical   amLODIPine (NORVASC) 5 mg tablet Take 5 mg by mouth in the morning. Yes Provider, Historical   TURMERIC, BULK, Take 1 Tablet by mouth three (3) times daily. Patient states 1 capsule of her turmeric is 500 mg   Yes Provider, Historical   fexofenadine (ALLEGRA) 180 mg tablet Take 180 mg by mouth in the morning. Yes Provider, Historical   potassium chloride SA (MICRO-K) 10 mEq capsule Take 20 mEq by mouth three (3) times daily. 5/13/15  Yes Provider, Historical   norethindrone acetate (AYGESTIN) 5 mg tablet Take 5 mg by mouth in the morning. 5/16/15  Yes Provider, Historical   coenzyme q10-vitamin e 100-100 mg-unit cap Take 200 mg by mouth daily. Yes Provider, Historical   montelukast (SINGULAIR) 10 mg tablet Take 10 mg by mouth daily. Yes Provider, Historical   NASONEX 50 mcg/actuation nasal spray 2 Sprays by Both Nostrils route two (2) times a day. 6/5/15  Yes Provider, Historical   cinnamon bark 500 mg cap Take 1 Tab by mouth two (2) times a day. Patient states the Cinnamon 1000 mg  is combined with Chromium 400 mg   Yes Provider, Historical   Biotin 2,500 mcg cap Take 5,000 mcg by mouth two (2) times a day. Yes Provider, Historical   ascorbic acid, vitamin C, (VITAMIN C) 250 mg tablet Take 500 mg by mouth in the morning. Yes Provider, Historical   OMALIZUMAB Gae Angel Luis SC) by SubCUTAneous route every thirty (30) days. Yes Provider, Historical   magnesium oxide (MAG-OX) 400 mg tablet Take 400 mg by mouth three (3) times daily. Patient not taking: Reported on 8/17/2022    Provider, Historical   ibuprofen (MOTRIN) 200 mg tablet Take 400 mg by mouth every eight (8) hours as needed for Pain. Patient not taking: Reported on 8/17/2022    Provider, Historical   ACETYLGLUCOSAMINE (N-ACETYL-ALPHA-D-GLUCOSAMINE) 1 Tab by Does Not Apply route daily. Patient not taking: No sig reported    Provider, Historical   albuterol (PROVENTIL HFA, VENTOLIN HFA, PROAIR HFA) 90 mcg/actuation inhaler Take 2 Puffs by inhalation as needed.   Patient not taking: Reported on 8/17/2022    Provider, Historical   cyclobenzaprine (FLEXERIL) 10 mg tablet Take  by mouth three (3) times daily as needed for Muscle Spasm(s). Patient not taking: Reported on 8/17/2022    Provider, Historical     Allergies   Allergen Reactions    Amoxicillin Shortness of Breath    Kenalog [Triamcinolone Acetonide] Swelling     cortisone flare    Metformin Other (comments)     lethargic       Patient Active Problem List    Diagnosis Date Noted    Morbid obesity due to excess calories ([facility] [address] 08/29/2017    Obesity (BMI 30-39.9) 10/13/2015    Vitamin D deficiency 07/14/2015    Asthma due to seasonal allergies 07/14/2015    Essential hypertension 07/14/2015    Acquired lymphedema of lower extremity 07/14/2015    DEANN on CPAP 07/14/2015     Current Outpatient Medications   Medication Sig Dispense Refill    KRILL OIL PO Take 1 Capsule by mouth daily. Raffaele Red brand      allergy injection by SubCUTAneous route every seven (7) days. calcium citrate/vitamin D3 (CITRACAL-D3 PETITES PO) Take 2 Caplet by mouth two (2) times a day. Breo Ellipta 200-25 mcg/dose inhaler INHALE 1 PUFF BY MOUTH EVERY DAY      hydroCHLOROthiazide (HYDRODIURIL) 25 mg tablet Take 25 mg by mouth in the morning. losartan (COZAAR) 100 mg tablet Take 100 mg by mouth in the morning. vitamin K2 100 mcg cap Take 1 Capsule by mouth in the morning. mv-min-folic acid-lutein (Centrum Silver) 400-250 mcg chew Take 1 Tablet by mouth every other day. multivit,iron,minerals/lutein (CENTRUM SILVER ULTRA WOMEN'S PO) Take 1 Tablet by mouth every other day. Every other day \"mini\"      cholecalciferol, VITAMIN D3, (VITAMIN D3) 5,000 unit tab tablet Take 5,000 Units by mouth in the morning. amLODIPine (NORVASC) 5 mg tablet Take 5 mg by mouth in the morning. TURMERIC, BULK, Take 1 Tablet by mouth three (3) times daily.  Patient states 1 capsule of her turmeric is 500 mg      fexofenadine (ALLEGRA) 180 mg tablet Take 180 mg by mouth in the morning. potassium chloride SA (MICRO-K) 10 mEq capsule Take 20 mEq by mouth three (3) times daily. norethindrone acetate (AYGESTIN) 5 mg tablet Take 5 mg by mouth in the morning. coenzyme q10-vitamin e 100-100 mg-unit cap Take 200 mg by mouth daily. montelukast (SINGULAIR) 10 mg tablet Take 10 mg by mouth daily. NASONEX 50 mcg/actuation nasal spray 2 Sprays by Both Nostrils route two (2) times a day. cinnamon bark 500 mg cap Take 1 Tab by mouth two (2) times a day. Patient states the Cinnamon 1000 mg  is combined with Chromium 400 mg      Biotin 2,500 mcg cap Take 5,000 mcg by mouth two (2) times a day. ascorbic acid, vitamin C, (VITAMIN C) 250 mg tablet Take 500 mg by mouth in the morning. OMALIZUMAB (XOLAIR SC) by SubCUTAneous route every thirty (30) days. magnesium oxide (MAG-OX) 400 mg tablet Take 400 mg by mouth three (3) times daily. (Patient not taking: Reported on 8/17/2022)      ibuprofen (MOTRIN) 200 mg tablet Take 400 mg by mouth every eight (8) hours as needed for Pain. (Patient not taking: Reported on 8/17/2022)      ACETYLGLUCOSAMINE (N-ACETYL-ALPHA-D-GLUCOSAMINE) 1 Tab by Does Not Apply route daily. (Patient not taking: No sig reported)      albuterol (PROVENTIL HFA, VENTOLIN HFA, PROAIR HFA) 90 mcg/actuation inhaler Take 2 Puffs by inhalation as needed. (Patient not taking: Reported on 8/17/2022)      cyclobenzaprine (FLEXERIL) 10 mg tablet Take  by mouth three (3) times daily as needed for Muscle Spasm(s).  (Patient not taking: Reported on 8/17/2022)         ROS      CC: Weight Management      Jonathon Arellano is a 46 y.o. female who is here for her      Weight Metrics 8/17/2022 8/8/2022 7/25/2022 7/12/2022 7/12/2022 8/29/2017 3/28/2017   Weight 291 lb 9.6 oz 295 lb 302 lb 8 oz - 308 lb 12.8 oz 234 lb 219 lb   Neck Circ (inches) - - - 15.5 - - -   Waist Measure Inches - - - 52 - - -   Body Fat % - - - 50.5 - - -   BMI 50.05 kg/m2 50.64 kg/m2 51.92 kg/m2 - 53.01 kg/m2 40.17 kg/m2 37.59 kg/m2         Outpatient Medications Marked as Taking for the 8/17/22 encounter (Virtual Visit) with Manav Ford MD   Medication Sig Dispense Refill    KRILL OIL PO Take 1 Capsule by mouth daily. Raffaele Red brand      allergy injection by SubCUTAneous route every seven (7) days. calcium citrate/vitamin D3 (CITRACAL-D3 PETITES PO) Take 2 Caplet by mouth two (2) times a day. Breo Ellipta 200-25 mcg/dose inhaler INHALE 1 PUFF BY MOUTH EVERY DAY      hydroCHLOROthiazide (HYDRODIURIL) 25 mg tablet Take 25 mg by mouth in the morning. losartan (COZAAR) 100 mg tablet Take 100 mg by mouth in the morning. vitamin K2 100 mcg cap Take 1 Capsule by mouth in the morning. mv-min-folic acid-lutein (Centrum Silver) 400-250 mcg chew Take 1 Tablet by mouth every other day. multivit,iron,minerals/lutein (CENTRUM SILVER ULTRA WOMEN'S PO) Take 1 Tablet by mouth every other day. Every other day \"mini\"      cholecalciferol, VITAMIN D3, (VITAMIN D3) 5,000 unit tab tablet Take 5,000 Units by mouth in the morning. amLODIPine (NORVASC) 5 mg tablet Take 5 mg by mouth in the morning. TURMERIC, BULK, Take 1 Tablet by mouth three (3) times daily. Patient states 1 capsule of her turmeric is 500 mg      fexofenadine (ALLEGRA) 180 mg tablet Take 180 mg by mouth in the morning. potassium chloride SA (MICRO-K) 10 mEq capsule Take 20 mEq by mouth three (3) times daily. norethindrone acetate (AYGESTIN) 5 mg tablet Take 5 mg by mouth in the morning. coenzyme q10-vitamin e 100-100 mg-unit cap Take 200 mg by mouth daily. montelukast (SINGULAIR) 10 mg tablet Take 10 mg by mouth daily. NASONEX 50 mcg/actuation nasal spray 2 Sprays by Both Nostrils route two (2) times a day. cinnamon bark 500 mg cap Take 1 Tab by mouth two (2) times a day.  Patient states the Cinnamon 1000 mg  is combined with Chromium 400 mg      Biotin 2,500 mcg cap Take 5,000 mcg by mouth two (2) times a day. ascorbic acid, vitamin C, (VITAMIN C) 250 mg tablet Take 500 mg by mouth in the morning. OMALIZUMAB (XOLAIR SC) by SubCUTAneous route every thirty (30) days. Participation   Did you attend clinic and class last week? no    Review of Systems  Since your last visit, have you experienced any complications? no  If yes, please list:       Are you taking an appetite suppressant? no  If so, is there any Chest Pain, Palpitations or Dizziness? HUNGER CONTROL: good\" for the most part\"    BP Readings from Last 3 Encounters:   08/08/22 106/75   07/25/22 121/80   07/12/22 138/83       SLEEP:  7-8 on cpap    Have you received any other medical care this week? no  If yes, where and for what? Have you discontinued or changed any medicine or dose of your medicine since your last visit with Dr Cristo Jacobo? no  If yes, where and for what? Diet  How many ounces of calorie-free liquids did you consume each day? 64 oz    How many meal replacements did you take each day? 2    Did you have any problems adhering to the program?  no If yes, please explain:      Exercise  Aerobic exercise: only PT for the shoulder min  Resistance exercise:  workouts / week  Any discomfort? If yes, where? Objective  Visit Vitals  Ht 5' 4\" (1.626 m)   Wt 291 lb 9.6 oz (132.3 kg)   BMI 50.05 kg/m²     No LMP recorded. (Menstrual status: Chemically Induced).                     PHYSICAL EXAMINATION:  [ INSTRUCTIONS:  \"[x]\" Indicates a positive item  \"[]\" Indicates a negative item  -- DELETE ALL ITEMS NOT EXAMINED]  Vital Signs: (As obtained by patient/caregiver at home)  Visit Vitals  Ht 5' 4\" (1.626 m)   Wt 291 lb 9.6 oz (132.3 kg)   BMI 50.05 kg/m²        Constitutional: [x] Appears well-developed and well-nourished [x] No apparent distress      [] Abnormal -     Mental status: [x] Alert and awake  [x] Oriented to person/place/time [x] Able to follow commands    [] Abnormal -     Eyes:   EOM    [x] Normal    [] Abnormal -   Sclera  [x]  Normal    [] Abnormal -          Discharge [x]  None visible   [] Abnormal -     HENT: [x] Normocephalic, atraumatic  [] Abnormal -   [x] Mouth/Throat: Mucous membranes are moist    External Ears [x] Normal  [] Abnormal -    Neck: [x] No visualized mass [] Abnormal -     Pulmonary/Chest: [x] Respiratory effort normal   [x] No visualized signs of difficulty breathing or respiratory distress        [] Abnormal -      Musculoskeletal:   [x] Normal gait with no signs of ataxia         [x] Normal range of motion of neck        [] Abnormal -     Neurological:        [x] No Facial Asymmetry (Cranial nerve 7 motor function) (limited exam due to video visit)          [x] No gaze palsy        [] Abnormal -          Skin:        [x] No significant exanthematous lesions or discoloration noted on facial skin         [] Abnormal -            Psychiatric:       [x] Normal Affect [] Abnormal -        [x] No Hallucinations    Other pertinent observable physical exam findings:-      Assessment / Plan    Encounter Diagnoses   Name Primary? Class 3 severe obesity due to excess calories with serious comorbidity in adult, unspecified BMI ([address] Yes    Essential hypertension     DEANN on CPAP      Diagnoses and all orders for this visit:    1. Class 3 severe obesity due to excess calories with serious comorbidity in adult, unspecified BMI ([address]  Conrt ND 2 prod a day w one meal  Add vegetables w replacements and can have vegetable as a snack  Start walking w goal of 30 min 5 days a week. Right now do wht she can tolerate  No appetite meds needed at this time  2. Essential hypertension  No change in meds yet cont losartan at 100 mg and hctz at 25 mg  BP high normal on meds. 3. DEANN on CPAP  Use cpap every night  1. Weight management improved   Progress was reviewed with patient      2.   Labs    Latest results reviewed with patient         face to face time with Mirlande Murdock consisted of counseling & coordinating and/or discussing treatment plans in reference to her obesity The primary encounter diagnosis was Class 3 severe obesity due to excess calories with serious comorbidity in adult, unspecified BMI ([facility] [address] Diagnoses of Essential hypertension and DEANN on CPAP were also pertinent to this visit. Coding Help - Use CPT Codes 73925-54731, [id number] for Established and New Patients respectively, either employing EM elements or Time rules. Other codes (example consult codes) may also apply. We discussed the expected course, resolution and complications of the diagnosis(es) in detail. Medication risks, benefits, costs, interactions, and alternatives were discussed as indicated. I advised her to contact the office if her condition worsens, changes or fails to improve as anticipated. She expressed understanding with the diagnosis(es) and plan. Pursuant to the emergency declaration under the [facility], [facility] waiver authority and the [place] [place] [employer] General Act, this Virtual  Visit was conducted, with patient's consent, to reduce the patient's risk of exposure to COVID-19 and provide continuity of care for an established patient. Services were provided through a video synchronous discussion virtually to substitute for in-person clinic visit.     Nazia Pendleton MD

## 2022-08-19 ENCOUNTER — CLINICAL SUPPORT (OUTPATIENT)
Dept: SURGERY | Age: 52
End: 2022-08-19

## 2022-08-19 DIAGNOSIS — E66.01 CLASS 3 SEVERE OBESITY DUE TO EXCESS CALORIES WITH SERIOUS COMORBIDITY IN ADULT, UNSPECIFIED BMI (HCC): Primary | ICD-10-CM

## 2022-08-19 NOTE — PROGRESS NOTES
[facility]  Metabolic Program Follow-up Nutrition Consult    Date: 2022   Physician: Jesus Grant MD  Name: Bridgett Wade  :  1970    Type of Plan: LCD  Weeks on Plan: 5 weeks  Virtual visit was completed through [address] [place] [address]. ASSESSMENT:    Medications/Supplements:   Prior to Admission medications    Medication Sig Start Date End Date Taking? Authorizing Provider   KRILL OIL PO Take 1 Capsule by mouth daily. Raffaele Red brand    Provider, Historical   allergy injection by SubCUTAneous route every seven (7) days. Provider, Historical   calcium citrate/vitamin D3 (CITRACAL-D3 PETITES PO) Take 2 Caplet by mouth two (2) times a day. Provider, Historical   magnesium oxide (MAG-OX) 400 mg tablet Take 400 mg by mouth three (3) times daily. Patient not taking: Reported on 2022    Provider, Historical   Breo Ellipta 200-25 mcg/dose inhaler INHALE 1 PUFF BY MOUTH EVERY DAY 22   Provider, Historical   hydroCHLOROthiazide (HYDRODIURIL) 25 mg tablet Take 25 mg by mouth in the morning. 22   Provider, Historical   losartan (COZAAR) 100 mg tablet Take 100 mg by mouth in the morning. 22   Provider, Historical   vitamin K2 100 mcg cap Take 1 Capsule by mouth in the morning. Provider, Historical   mv-min-folic acid-lutein (Centrum Silver) 400-250 mcg chew Take 1 Tablet by mouth every other day. Provider, Historical   multivit,iron,minerals/lutein (CENTRUM SILVER ULTRA WOMEN'S PO) Take 1 Tablet by mouth every other day. Every other day \"mini\"    Provider, Historical   cholecalciferol, VITAMIN D3, (VITAMIN D3) 5,000 unit tab tablet Take 5,000 Units by mouth in the morning. Provider, Historical   amLODIPine (NORVASC) 5 mg tablet Take 5 mg by mouth in the morning. Provider, Historical   ibuprofen (MOTRIN) 200 mg tablet Take 400 mg by mouth every eight (8) hours as needed for Pain.   Patient not taking: Reported on 2022    Provider, Historical   TURMERIC, BULK, Take 1 Tablet by mouth three (3) times daily. Patient states 1 capsule of her turmeric is 500 mg    Provider, Historical   ACETYLGLUCOSAMINE (N-ACETYL-ALPHA-D-GLUCOSAMINE) 1 Tab by Does Not Apply route daily. Patient not taking: No sig reported    Provider, Historical   fexofenadine (ALLEGRA) 180 mg tablet Take 180 mg by mouth in the morning. Provider, Historical   potassium chloride SA (MICRO-K) 10 mEq capsule Take 20 mEq by mouth three (3) times daily. 5/13/15   Provider, Historical   norethindrone acetate (AYGESTIN) 5 mg tablet Take 5 mg by mouth in the morning. 5/16/15   Provider, Historical   coenzyme q10-vitamin e 100-100 mg-unit cap Take 200 mg by mouth daily. Provider, Historical   montelukast (SINGULAIR) 10 mg tablet Take 10 mg by mouth daily. Provider, Historical   NASONEX 50 mcg/actuation nasal spray 2 Sprays by Both Nostrils route two (2) times a day. 6/5/15   Provider, Historical   albuterol (PROVENTIL HFA, VENTOLIN HFA, PROAIR HFA) 90 mcg/actuation inhaler Take 2 Puffs by inhalation as needed. Patient not taking: Reported on 8/17/2022    Provider, Historical   cyclobenzaprine (FLEXERIL) 10 mg tablet Take  by mouth three (3) times daily as needed for Muscle Spasm(s). Patient not taking: Reported on 8/17/2022    Provider, Historical   cinnamon bark 500 mg cap Take 1 Tab by mouth two (2) times a day. Patient states the Cinnamon 1000 mg  is combined with Chromium 400 mg    Provider, Historical   Biotin 2,500 mcg cap Take 5,000 mcg by mouth two (2) times a day. Provider, Historical   ascorbic acid, vitamin C, (VITAMIN C) 250 mg tablet Take 500 mg by mouth in the morning. Provider, Historical   OMALIZUMAB Valiant Ayden SC) by SubCUTAneous route every thirty (30) days.     Provider, Historical              Starting Weight: 308#  Current Weight: 291#  Overall Pounds Lost: 17# Overall Pounds Gained: 0    Exercise/Physical Activity:none    Is patient using [facility] products:yes  If yes, how many per day:3    Aversions/side effects of product/program:none    Fluids used to mix with products:water    Reported Diet History:2 ND meals replacements, 1 ND bar, 1 meal.      Dinners: Mahajan salad from [employer]; [employer] 3 piece dark with double broccoli. Also had red lentil pasta tossed with broccoli and rotisserie chicken. Loves cauliflower, edamame. Has made kale chips. Snacks: cucumbers    Beverages: water 64 ounces, plus 2 sparkling Ice drinks. Barriers/concerns preventing patient from achieving goal(s) since last visit:very little variety with cooking. NUTRITION INTERVENTION:  Pt educated on nutrition recommendations for the [facility] Low Calorie Plan (LCD), with the specific meal pattern of 2 meal replacements every day plus a grocery meal and snack. Daily recommended totals: 1200 calories, 60 grams carbs, 80+ grams protein, and remaining calories as healthy fats. Use LCD handout for meal and snack suggestions and preparation. Choose lean protein and non-starchy vegetable OR an additional meal replacement on days of increased activity/exercise to prevent injury, dizziness/lightheadedness, or delay in healing/repairing after exercise. Consult provider and RD prior to increase in exercise routine. IF choosing grocery meal/snack: Read all nutrition labels. Demonstrated and emphasized identifying serving size, total fat, sugar and protein content. Defined low fat as </= 3 g per serving. Discussed lean and extra lean sources of protein. Avoid foods with sugar listed in the first 3 ingredients and >/10 g sugar per serving. Consume meal replacements every three to four hours or pattern as discussed with provider. Mix with water. May add herbs/spices for taste. Refer to recipe book for meal replacements ideas to alter taste, texture, and temperature while keeping macronutrient distribution within program guidelines.      Practice mindful eating habits; take small bites, chew thoroughly, avoid distractions, utilize hunger/fullness scale. Reviewed attendance policy of attending weekly nutrition classes. Metabolic support group recommended, and increase physical activity (approved per MD) for long term weight maintenance. NUTRITION MONITORING AND EVALUATION:  17# loss x 30 days. Meeting goals, she is motivated, and adherence is likely. She is an  with school starting back in few weeks, but reports this not being a barrier to her success so far. No changes in care. Continue current plan. The following goals were established with patient;  1) increase movement to 150 minutes a week as a first goal  2) try new vegetables roasted in oven, such as brussels sprouts, cauliflower - use leftovers for salads next day  3) continue rotating protein on salads, and choosing half portions when out to eat  4) great job overall! Specific tips and techniques to facilitate compliance with above recommendations were provided and discussed. If further details are desired please contact me at [phone]. This phone number was also provided to the patient for any further questions or concerns.           Ilan Mae, MS, RD, LDN

## 2022-08-22 ENCOUNTER — CLINICAL SUPPORT (OUTPATIENT)
Dept: SURGERY | Age: 52
End: 2022-08-22

## 2022-08-22 VITALS
WEIGHT: 288.4 LBS | HEART RATE: 93 BPM | BODY MASS INDEX: 49.24 KG/M2 | DIASTOLIC BLOOD PRESSURE: 79 MMHG | OXYGEN SATURATION: 96 % | HEIGHT: 64 IN | TEMPERATURE: 97.9 F | SYSTOLIC BLOOD PRESSURE: 120 MMHG | RESPIRATION RATE: 20 BRPM

## 2022-08-22 DIAGNOSIS — E66.01 CLASS 3 SEVERE OBESITY DUE TO EXCESS CALORIES WITH SERIOUS COMORBIDITY IN ADULT, UNSPECIFIED BMI (HCC): Primary | ICD-10-CM

## 2022-08-22 DIAGNOSIS — G47.33 OSA ON CPAP: ICD-10-CM

## 2022-08-22 DIAGNOSIS — Z99.89 OSA ON CPAP: ICD-10-CM

## 2022-08-22 DIAGNOSIS — I10 ESSENTIAL HYPERTENSION: ICD-10-CM

## 2022-08-22 RX ORDER — MELOXICAM 7.5 MG/1
TABLET ORAL DAILY
COMMUNITY
End: 2022-09-30 | Stop reason: ALTCHOICE

## 2022-08-22 RX ORDER — EPINEPHRINE 0.3 MG/.3ML
0.3 INJECTION SUBCUTANEOUS
COMMUNITY

## 2022-08-22 NOTE — PROGRESS NOTES
1. Have you been to the ER, urgent care clinic since your last visit? Hospitalized since your last visit? Yes When: [facility]  08/19/20[place], for right should issues    2. Have you seen or consulted any other health care providers outside of the [address] [place] [address] since your last visit? Include any pap smears or colon screening. Yes When: 08/18/2022 [facility], for the shoulder issues        Progress Note: Weekly Education Class in the [facility] Weight Loss Program         Patient is on Very Low Calorie Diet [] (4 meal replacements per day, 800 kcal/day)      Low Calorie Diet [x] (2-3 meal replacements per day, 6859-5101 kcal/day)    1) Did patient have any new symptoms or physical problems? Yes []    No [x]    If yes, check & comment: weakness [], fatigue [], lightheadedness [], headache [], cramps [], cold intolerance [], hair loss [], diarrhea [], constipation [],  NA [] other:                                 2) Has patient had any medical attention from other providers, urgent care or the emergency room this week? Yes [x]  No []       NA [], If yes, why:   ER/Orthopedic/ physical therapy for shoulder                                    3) Any other sugar sweetened beverages consumed this week? Yes []  No [x]    4) Did patient have any problems adhering to the diet? Yes []  No [x] NA []    If yes, Vacation [], Celebrations [], Conferences [], Family Reunions [] other:                                                5) How many hours of sleep this week?   6-52-8  (range)  NA []    Number of meal replacements consumed daily? 4(range)  NA []    Average ounces of water patient consumed daily this week (not including shakes)?    64   (divide the weekly total by 7)    Did you eat any food outside of the program? Yes [] No [x]    Physical Activity Over the Past Week:    Cardio exercise:  n/a min  Strength exercise:  n/a workouts / week  Number of steps walked per day: 0438-5974    How has patient mood overall been this week? Sad [], Happy [], Stressed [], Tired [], Content [], NA [], other   upbeat         Medications reconciled by nurse Yes [x]  No[]    Patient was given therapeutic recommendations for any noted side effects of their dietary approach based upon [facility] patient manual per providers recommendation. Progress Note: Weekly Education Class in the [facility] Weight Loss Program         Patient is on Very Low Calorie Diet [] (4 meal replacements per day, 800 kcal/day)      Low Calorie Diet [x] (2-3 meal replacements per day, 1789-3732 kcal/day)    1) Did patient have any new symptoms or physical problems? Yes []    No [x]    If yes, check & comment: weakness [], fatigue [], lightheadedness [], headache [], cramps [], cold intolerance [], hair loss [], diarrhea [], constipation [],  NA [] other:                                 2) Has patient had any medical attention from other providers, urgent care or the emergency room this week? Yes []  No [x]       NA [], If yes, why:                                       3) Any other sugar sweetened beverages consumed this week? Yes []  No [x]    4) Did patient have any problems adhering to the diet? Yes []  No [x] NA []    If yes, Vacation [], Celebrations [], Conferences [], Family Reunions [] other:                                                5) How many hours of sleep this week?    7.5-8.4 (range)  NA []    Number of meal replacements consumed daily? 4(range)  NA []    Average ounces of water patient consumed daily this week (not including shakes)? 32   (divide the weekly total by 7)    Did you eat any food outside of the program? Yes [x] No []    Physical Activity Over the Past Week:    Cardio exercise: n/a min  Strength exercise: 2 workouts / week  Number of steps walked per day: 0477-2569    How has patient mood overall been this week?  Sad [], Happy [], Stressed [], Tired [], Content [x], NA [], other Medications reconciled by nurse Yes [x]  No[]    Patient was given therapeutic recommendations for any noted side effects of their dietary approach based upon [facility] patient manual per providers recommendation.

## 2022-08-24 ENCOUNTER — OFFICE VISIT (OUTPATIENT)
Dept: SURGERY | Age: 52
End: 2022-08-24

## 2022-08-24 DIAGNOSIS — E66.01 CLASS 3 SEVERE OBESITY DUE TO EXCESS CALORIES WITH SERIOUS COMORBIDITY IN ADULT, UNSPECIFIED BMI (HCC): Primary | ICD-10-CM

## 2022-08-25 NOTE — PROGRESS NOTES
[facility]  Metabolic Weight Loss Program        Patient's Name: Chela Tavares  : 1970    This patient is enrolled in [address] [place] [address] Weight Loss Program and attended the required weekly virtual nutrition class hosted via [employer].       Nan Marquis, MS, RD, LDN

## 2022-08-27 NOTE — PROGRESS NOTES
Nurse note from patient's weekly  LCD / Maintenance class was reviewed. Pertinent medical concerns were:   reviewed     BP Readings from Last 3 Encounters:   08/22/22 120/79   08/08/22 106/75   07/25/22 121/80       Weight Metrics 8/22/2022 8/17/2022 8/8/2022 7/25/2022 7/12/2022 7/12/2022 8/29/2017   Weight 288 lb 6.4 oz 291 lb 9.6 oz 295 lb 302 lb 8 oz - 308 lb 12.8 oz 234 lb   Neck Circ (inches) - - - - 15.5 - -   Waist Measure Inches - - - - 52 - -   Body Fat % - - - - 50.5 - -   BMI 49.5 kg/m2 50.05 kg/m2 50.64 kg/m2 51.92 kg/m2 - 53.01 kg/m2 40.17 kg/m2       Current Outpatient Medications   Medication Sig Dispense Refill    KRILL OIL PO Take 1 Capsule by mouth daily. Raffaele Red brand      allergy injection by SubCUTAneous route every seven (7) days. calcium citrate/vitamin D3 (CITRACAL-D3 PETITES PO) Take 2 Caplet by mouth two (2) times a day. magnesium oxide (MAG-OX) 400 mg tablet Take 400 mg by mouth three (3) times daily. Breo Ellipta 200-25 mcg/dose inhaler INHALE 1 PUFF BY MOUTH EVERY DAY      hydroCHLOROthiazide (HYDRODIURIL) 25 mg tablet Take 25 mg by mouth in the morning. losartan (COZAAR) 100 mg tablet Take 100 mg by mouth in the morning. vitamin K2 100 mcg cap Take 1 Capsule by mouth in the morning. mv-min-folic acid-lutein (Centrum Silver) 400-250 mcg chew Take 1 Tablet by mouth every other day. multivit,iron,minerals/lutein (CENTRUM SILVER ULTRA WOMEN'S PO) Take 1 Tablet by mouth every other day. Every other day \"mini\"      cholecalciferol, VITAMIN D3, (VITAMIN D3) 5,000 unit tab tablet Take 5,000 Units by mouth in the morning. amLODIPine (NORVASC) 5 mg tablet Take 5 mg by mouth in the morning. ibuprofen (MOTRIN) 200 mg tablet Take 400 mg by mouth every eight (8) hours as needed for Pain. (Patient not taking: No sig reported)      TURMERIC, BULK, Take 1 Tablet by mouth three (3) times daily.  Patient states 1 capsule of her turmeric is 500 mg fexofenadine (ALLEGRA) 180 mg tablet Take 180 mg by mouth in the morning. potassium chloride SA (MICRO-K) 10 mEq capsule Take 20 mEq by mouth three (3) times daily. norethindrone acetate (AYGESTIN) 5 mg tablet Take 5 mg by mouth in the morning. coenzyme q10-vitamin e 100-100 mg-unit cap Take 200 mg by mouth daily. montelukast (SINGULAIR) 10 mg tablet Take 10 mg by mouth daily. NASONEX 50 mcg/actuation nasal spray 2 Sprays by Both Nostrils route two (2) times a day. albuterol (PROVENTIL HFA, VENTOLIN HFA, PROAIR HFA) 90 mcg/actuation inhaler Take 2 Puffs by inhalation as needed. cyclobenzaprine (FLEXERIL) 10 mg tablet Take  by mouth three (3) times daily as needed for Muscle Spasm(s). (Patient not taking: No sig reported)      cinnamon bark 500 mg cap Take 1 Tab by mouth two (2) times a day. Patient states the Cinnamon 1000 mg  is combined with Chromium 400 mg      Biotin 2,500 mcg cap Take 5,000 mcg by mouth two (2) times a day. ascorbic acid, vitamin C, (VITAMIN C) 250 mg tablet Take 500 mg by mouth in the morning. OMALIZUMAB (XOLAIR SC) by SubCUTAneous route every thirty (30) days. meloxicam (Mobic) 7.5 mg tablet Take  by mouth daily. EPINEPHrine (EpiPen) 0.3 mg/0.3 mL injection 0.3 mg by IntraMUSCular route once as needed for Allergic Response. ACETYLGLUCOSAMINE (N-ACETYL-ALPHA-D-GLUCOSAMINE) 1 Tab by Does Not Apply route daily.  (Patient not taking: No sig reported)

## 2022-08-31 ENCOUNTER — OFFICE VISIT (OUTPATIENT)
Dept: SURGERY | Age: 52
End: 2022-08-31

## 2022-08-31 DIAGNOSIS — E66.01 CLASS 3 SEVERE OBESITY DUE TO EXCESS CALORIES WITH SERIOUS COMORBIDITY IN ADULT, UNSPECIFIED BMI (HCC): Primary | ICD-10-CM

## 2022-09-01 NOTE — PROGRESS NOTES
[facility] Management Center  Metabolic Weight Loss Program        Patient's Name: Alfred Gauthier  : 1970    This patient is enrolled in [address] [place] [address] Weight Loss Program and attended the required weekly virtual nutrition class hosted via [employer] today.       Kaylie Lawson, MS, RD, LDN

## 2022-09-02 NOTE — PROGRESS NOTES
Nurse note from patient's weekly LCD  class was reviewed. Pertinent medical concerns were:   reviewed     BP Readings from Last 3 Encounters:   08/22/22 120/79   08/08/22 106/75   07/25/22 121/80       Weight Metrics 8/22/2022 8/17/2022 8/8/2022 7/25/2022 7/12/2022 7/12/2022 8/29/2017   Weight 288 lb 6.4 oz 291 lb 9.6 oz 295 lb 302 lb 8 oz - 308 lb 12.8 oz 234 lb   Neck Circ (inches) - - - - 15.5 - -   Waist Measure Inches - - - - 52 - -   Body Fat % - - - - 50.5 - -   BMI 49.5 kg/m2 50.05 kg/m2 50.64 kg/m2 51.92 kg/m2 - 53.01 kg/m2 40.17 kg/m2       Current Outpatient Medications   Medication Sig Dispense Refill    meloxicam (Mobic) 7.5 mg tablet Take  by mouth daily. EPINEPHrine (EpiPen) 0.3 mg/0.3 mL injection 0.3 mg by IntraMUSCular route once as needed for Allergic Response. allergy injection by SubCUTAneous route every seven (7) days. calcium citrate/vitamin D3 (CITRACAL-D3 PETITES PO) Take 2 Caplet by mouth two (2) times a day. magnesium oxide (MAG-OX) 400 mg tablet Take 400 mg by mouth three (3) times daily. Breo Ellipta 200-25 mcg/dose inhaler INHALE 1 PUFF BY MOUTH EVERY DAY      hydroCHLOROthiazide (HYDRODIURIL) 25 mg tablet Take 25 mg by mouth in the morning. losartan (COZAAR) 100 mg tablet Take 100 mg by mouth in the morning. vitamin K2 100 mcg cap Take 1 Capsule by mouth in the morning. mv-min-folic acid-lutein (Centrum Silver) 400-250 mcg chew Take 1 Tablet by mouth every other day. multivit,iron,minerals/lutein (CENTRUM SILVER ULTRA WOMEN'S PO) Take 1 Tablet by mouth every other day. Every other day \"mini\"      cholecalciferol, VITAMIN D3, (VITAMIN D3) 5,000 unit tab tablet Take 5,000 Units by mouth in the morning. amLODIPine (NORVASC) 5 mg tablet Take 5 mg by mouth in the morning. TURMERIC, BULK, Take 1 Tablet by mouth three (3) times daily.  Patient states 1 capsule of her turmeric is 500 mg      fexofenadine (ALLEGRA) 180 mg tablet Take 180 mg by mouth in the morning. potassium chloride SA (MICRO-K) 10 mEq capsule Take 20 mEq by mouth three (3) times daily. norethindrone acetate (AYGESTIN) 5 mg tablet Take 5 mg by mouth in the morning. coenzyme q10-vitamin e 100-100 mg-unit cap Take 200 mg by mouth daily. montelukast (SINGULAIR) 10 mg tablet Take 10 mg by mouth daily. NASONEX 50 mcg/actuation nasal spray 2 Sprays by Both Nostrils route two (2) times a day. albuterol (PROVENTIL HFA, VENTOLIN HFA, PROAIR HFA) 90 mcg/actuation inhaler Take 2 Puffs by inhalation as needed. cinnamon bark 500 mg cap Take 1 Tab by mouth two (2) times a day. Patient states the Cinnamon 1000 mg  is combined with Chromium 400 mg      Biotin 2,500 mcg cap Take 5,000 mcg by mouth two (2) times a day. ascorbic acid, vitamin C, (VITAMIN C) 250 mg tablet Take 500 mg by mouth in the morning. OMALIZUMAB (XOLAIR SC) by SubCUTAneous route every thirty (30) days. KRILL OIL PO Take 1 Capsule by mouth daily. Raffaele Red brand      ibuprofen (MOTRIN) 200 mg tablet Take 400 mg by mouth every eight (8) hours as needed for Pain. (Patient not taking: No sig reported)      ACETYLGLUCOSAMINE (N-ACETYL-ALPHA-D-GLUCOSAMINE) 1 Tab by Does Not Apply route daily. (Patient not taking: No sig reported)      cyclobenzaprine (FLEXERIL) 10 mg tablet Take  by mouth three (3) times daily as needed for Muscle Spasm(s).  (Patient not taking: No sig reported)

## 2022-09-07 ENCOUNTER — OFFICE VISIT (OUTPATIENT)
Dept: SURGERY | Age: 52
End: 2022-09-07

## 2022-09-07 DIAGNOSIS — E66.01 CLASS 3 SEVERE OBESITY DUE TO EXCESS CALORIES WITH SERIOUS COMORBIDITY IN ADULT, UNSPECIFIED BMI (HCC): Primary | ICD-10-CM

## 2022-09-09 NOTE — PROGRESS NOTES
[facility]  Metabolic Weight Loss Program        Patient's Name: Jonathon Arellano  : 1970    This patient is enrolled in [address] [place] [address] Weight Loss Program and attended the required weekly virtual nutrition class hosted via [address] [place] [address] today.       Chloe Vega, MS, RD, LDN

## 2022-09-12 ENCOUNTER — CLINICAL SUPPORT (OUTPATIENT)
Dept: SURGERY | Age: 52
End: 2022-09-12

## 2022-09-12 VITALS
TEMPERATURE: 98.8 F | HEIGHT: 64 IN | WEIGHT: 281.8 LBS | HEART RATE: 108 BPM | BODY MASS INDEX: 48.11 KG/M2 | RESPIRATION RATE: 18 BRPM | OXYGEN SATURATION: 97 % | DIASTOLIC BLOOD PRESSURE: 85 MMHG | SYSTOLIC BLOOD PRESSURE: 124 MMHG

## 2022-09-12 DIAGNOSIS — Z99.89 OSA ON CPAP: ICD-10-CM

## 2022-09-12 DIAGNOSIS — G47.33 OSA ON CPAP: ICD-10-CM

## 2022-09-12 DIAGNOSIS — I10 ESSENTIAL HYPERTENSION: ICD-10-CM

## 2022-09-12 DIAGNOSIS — E66.01 CLASS 3 SEVERE OBESITY DUE TO EXCESS CALORIES WITH SERIOUS COMORBIDITY IN ADULT, UNSPECIFIED BMI (HCC): Primary | ICD-10-CM

## 2022-09-12 DIAGNOSIS — I89.0 ACQUIRED LYMPHEDEMA OF LOWER EXTREMITY: ICD-10-CM

## 2022-09-12 RX ORDER — HYDROQUINONE 40 MG/G
CREAM TOPICAL
COMMUNITY
Start: 2022-08-18

## 2022-09-12 RX ORDER — CLINDAMYCIN PHOSPHATE 11.9 MG/ML
SOLUTION TOPICAL
COMMUNITY
Start: 2022-08-17

## 2022-09-12 NOTE — PROGRESS NOTES
Weight Management. 1. Have you been to the ER, urgent care clinic since your last visit? Hospitalized since your last visit? yes, [facility] for Right Frozen Shoulder    2. Have you seen or consulted any other health care providers outside of the [address] [place] [address] since your last visit? Include any pap smears or colon screening.  No

## 2022-09-12 NOTE — PROGRESS NOTES
8/29/2022    Progress Note: Weekly Education Class in the [facility] Weight Loss Program         Patient is on Very Low Calorie Diet [] (4 meal replacements per day, 800 kcal/day)      Low Calorie Diet [x] (2-3 meal replacements per day, 6614-6325 kcal/day)    1) Did patient have any new symptoms or physical problems? Yes []    No [x]    If yes, check & comment: weakness [], fatigue [], lightheadedness [], headache [], cramps [], cold intolerance [], hair loss [], diarrhea [], constipation [],  NA [] other:                                 2) Has patient had any medical attention from other providers, urgent care or the emergency room this week? Yes []  No [x]       NA [], If yes, why:                                       3) Any other sugar sweetened beverages consumed this week? Yes []  No [x]    4) Did patient have any problems adhering to the diet? Yes []  No [x] NA []    If yes, Vacation [], Celebrations [], Conferences [], Family Reunions [] other:                                                5) How many hours of sleep this week? 6-8.5    (range)  NA []    Number of meal replacements consumed daily? 4-5  (range)  NA []    Average ounces of water patient consumed daily this week (not including shakes)? 37     (divide the weekly total by 7)    Did you eat any food outside of the program? Yes [x] No []    Physical Activity Over the Past Week:    Cardio exercise: 70 min  Strength exercise: 0 workouts / week  Number of steps walked per day: 5540-4393    How has patient mood overall been this week? Sad [], Happy [x], Stressed [], Tired [], Content [], NA [], other            Medications reconciled by nurse Yes [x]  No[]    Patient was given therapeutic recommendations for any noted side effects of their dietary approach based upon [facility] patient manual per providers recommendation.      9/5/2022    Progress Note: Weekly Education Class in the [facility] Weight Loss Program         Patient is on Very Low Calorie Diet [] (4 meal replacements per day, 800 kcal/day)      Low Calorie Diet [x] (2-3 meal replacements per day, 1660-7773 kcal/day)    1) Did patient have any new symptoms or physical problems? Yes []    No [x]    If yes, check & comment: weakness [], fatigue [], lightheadedness [], headache [], cramps [], cold intolerance [], hair loss [], diarrhea [], constipation [],  NA [] other:                                 2) Has patient had any medical attention from other providers, urgent care or the emergency room this week? Yes [x]  No []       NA [], If yes, why: COVID  booster                                      3) Any other sugar sweetened beverages consumed this week? Yes []  No [x]    4) Did patient have any problems adhering to the diet? Yes []  No [x] NA []    If yes, Vacation [], Celebrations [], Conferences [], Family Reunions [] other:                                                5) How many hours of sleep this week? 6.75-9    (range)  NA []    Number of meal replacements consumed daily? 4 (range)  NA []    Average ounces of water patient consumed daily this week (not including shakes)? 39     (divide the weekly total by 7)    Did you eat any food outside of the program? Yes [x] No []    Physical Activity Over the Past Week:    Cardio exercise: 105 min  Strength exercise: 0 workouts / week  Number of steps walked per day: 8486-2678    How has patient mood overall been this week? Sad [], Happy [], Stressed [], Tired [], Content [], NA [], other NOT ANSWERED             Medications reconciled by nurse Yes [x]  No[]    Patient was given therapeutic recommendations for any noted side effects of their dietary approach based upon [facility] patient manual per providers recommendation.

## 2022-09-13 ENCOUNTER — TELEPHONE (OUTPATIENT)
Dept: SURGERY | Age: 52
End: 2022-09-13

## 2022-09-13 LAB — TSH SERPL DL<=0.005 MIU/L-ACNC: 1.69 UIU/ML (ref 0.45–4.5)

## 2022-09-13 NOTE — TELEPHONE ENCOUNTER
Called patient in regards to appointment with Radha Hernandez on 9/14/2022 at 1 pm, we will need to reschedule to an earlier time or a different day. Please call the office to get rescheduled

## 2022-09-14 ENCOUNTER — OFFICE VISIT (OUTPATIENT)
Dept: SURGERY | Age: 52
End: 2022-09-14

## 2022-09-14 DIAGNOSIS — E66.01 CLASS 3 SEVERE OBESITY DUE TO EXCESS CALORIES WITH SERIOUS COMORBIDITY IN ADULT, UNSPECIFIED BMI (HCC): Primary | ICD-10-CM

## 2022-09-16 NOTE — PROGRESS NOTES
[facility] Management [facility]  Metabolic Weight Loss Program        Patient's Name: Nick Montilla  : 1970    This patient is enrolled in [address] [place] [address] Weight Loss Program and attended the required weekly virtual nutrition class hosted via [employer] today.       Ana Hill, MS, RD, LDN

## 2022-09-21 ENCOUNTER — VIRTUAL VISIT (OUTPATIENT)
Dept: SURGERY | Age: 52
End: 2022-09-21
Payer: COMMERCIAL

## 2022-09-21 ENCOUNTER — OFFICE VISIT (OUTPATIENT)
Dept: SURGERY | Age: 52
End: 2022-09-21

## 2022-09-21 VITALS — HEIGHT: 64 IN | BODY MASS INDEX: 47.07 KG/M2 | WEIGHT: 275.7 LBS

## 2022-09-21 DIAGNOSIS — G47.33 OSA ON CPAP: ICD-10-CM

## 2022-09-21 DIAGNOSIS — I10 ESSENTIAL HYPERTENSION: ICD-10-CM

## 2022-09-21 DIAGNOSIS — Z99.89 OSA ON CPAP: ICD-10-CM

## 2022-09-21 DIAGNOSIS — E66.01 CLASS 3 SEVERE OBESITY DUE TO EXCESS CALORIES WITH SERIOUS COMORBIDITY IN ADULT, UNSPECIFIED BMI (HCC): Primary | ICD-10-CM

## 2022-09-21 PROCEDURE — 99214 OFFICE O/P EST MOD 30 MIN: CPT | Performed by: FAMILY MEDICINE

## 2022-09-21 RX ORDER — GABAPENTIN 100 MG/1
100 CAPSULE ORAL
COMMUNITY

## 2022-09-21 NOTE — PROGRESS NOTES
1. Have you been to the ER, urgent care clinic since your last visit? Hospitalized since your last visit? Yes ER visit for shoulder pain    2. Have you seen or consulted any other health care providers outside of the [address] [place] [address] since your last visit? Include any pap smears or colon screening.  No

## 2022-09-21 NOTE — PROGRESS NOTES
[facility] Progress Note:   F/up Physician Visit    Yovanny Carpio is a 45 y.o. female who was seen by synchronous (real-time) audio-video technology on 9/21/2022. Consent:  She and/or her healthcare decision maker is aware that this patient-initiated Telehealth encounter is a billable service, with coverage as determined by her insurance carrier. She is aware that she may receive a bill and has provided verbal consent to proceed: Yes    I was at home while conducting this encounter. 712  Subjective:   Yovanny Carpio was seen for Weight Management    f/up physician visit for the LCD Program.  Started July at 24  Now 89  Doing well on new direction LCD    Prior to Admission medications    Medication Sig Start Date End Date Taking? Authorizing Provider   gabapentin (NEURONTIN) 100 mg capsule Take  by mouth three (3) times daily. Yes Provider, Historical   clindamycin (CLEOCIN T) 1 % external solution APPLY PEA SIZED AMOUNT TOPICALLY TO THE AFFECTED AREA TWICE DAILY AS NEEDED 8/17/22  Yes Provider, Historical   hydroquinone (ESOTERICA, MELQUIN) 4 % topical cream  8/18/22  Yes Provider, Historical   lidocain-me. salicyl-caps-menth 1.3-15-6.688-5 % ptmd by Apply Externally route. Yes Provider, Historical   meloxicam (MOBIC) 7.5 mg tablet Take  by mouth daily. Yes Provider, Historical   EPINEPHrine (EPIPEN) 0.3 mg/0.3 mL injection 0.3 mg by IntraMUSCular route once as needed for Allergic Response. Yes Provider, Historical   KRILL OIL PO Take 1 Capsule by mouth daily. Raffaele Red brand   Yes Provider, Historical   allergy injection by SubCUTAneous route every seven (7) days. Yes Provider, Historical   calcium citrate/vitamin D3 (CITRACAL-D3 PETITES PO) Take 2 Caplet by mouth two (2) times a day. Yes Provider, Historical   magnesium oxide (MAG-OX) 400 mg tablet Take 400 mg by mouth three (3) times daily.    Yes Provider, Historical   Breo Ellipta 200-25 mcg/dose inhaler INHALE 1 PUFF BY MOUTH EVERY DAY 7/5/22  Yes Provider, Historical   hydroCHLOROthiazide (HYDRODIURIL) 25 mg tablet Take 25 mg by mouth in the morning. 7/7/22  Yes Provider, Historical   losartan (COZAAR) 100 mg tablet Take 100 mg by mouth in the morning. 5/20/22  Yes Provider, Historical   vitamin K2 100 mcg cap Take 1 Capsule by mouth in the morning. Yes Provider, Historical   mv-min-folic acid-lutein (Centrum Silver) 400-250 mcg chew Take 1 Tablet by mouth every other day. Yes Provider, Historical   multivit,iron,minerals/lutein (CENTRUM SILVER ULTRA WOMEN'S PO) Take 1 Tablet by mouth every other day. Every other day \"mini\"   Yes Provider, Historical   cholecalciferol, VITAMIN D3, (VITAMIN D3) 5,000 unit tab tablet Take 5,000 Units by mouth in the morning. Yes Provider, Historical   amLODIPine (NORVASC) 5 mg tablet Take 5 mg by mouth in the morning. Yes Provider, Historical   TURMERIC, BULK, Take 1 Tablet by mouth three (3) times daily. Patient states 1 capsule of her turmeric is 500 mg   Yes Provider, Historical   fexofenadine (ALLEGRA) 180 mg tablet Take 180 mg by mouth in the morning. Yes Provider, Historical   potassium chloride SA (MICRO-K) 10 mEq capsule Take 20 mEq by mouth three (3) times daily. 5/13/15  Yes Provider, Historical   norethindrone acetate (AYGESTIN) 5 mg tablet Take 5 mg by mouth in the morning. 5/16/15  Yes Provider, Historical   coenzyme q10-vitamin e 100-100 mg-unit cap Take 200 mg by mouth daily. Yes Provider, Historical   montelukast (SINGULAIR) 10 mg tablet Take 10 mg by mouth daily. Yes Provider, Historical   NASONEX 50 mcg/actuation nasal spray 2 Sprays by Both Nostrils route two (2) times a day. 6/5/15  Yes Provider, Historical   albuterol (PROVENTIL HFA, VENTOLIN HFA, PROAIR HFA) 90 mcg/actuation inhaler Take 2 Puffs by inhalation as needed. Yes Provider, Historical   cyclobenzaprine (FLEXERIL) 10 mg tablet Take 10 mg by mouth nightly.    Yes Provider, Historical   cinnamon bark 500 mg cap Take 1 Tab by mouth two (2) times a day. Patient states the Cinnamon 1000 mg  is combined with Chromium 400 mg   Yes Provider, Historical   Biotin 2,500 mcg cap Take 5,000 mcg by mouth two (2) times a day. Yes Provider, Historical   ascorbic acid, vitamin C, (VITAMIN C) 250 mg tablet Take 500 mg by mouth in the morning. Yes Provider, Historical   OMALIZUMAB Carmita Smith [place]) by SubCUTAneous route every thirty (30) days. Yes Provider, Historical     Allergies   Allergen Reactions    Amoxicillin Shortness of Breath    Kenalog [Triamcinolone Acetonide] Swelling     cortisone flare    Metformin Other (comments)     lethargic       Patient Active Problem List    Diagnosis Date Noted    Morbid obesity due to excess calories ([facility] [address] 08/29/2017    Obesity (BMI 30-39.9) 10/13/2015    Vitamin D deficiency 07/14/2015    Asthma due to seasonal allergies 07/14/2015    Essential hypertension 07/14/2015    Acquired lymphedema of lower extremity 07/14/2015    DEANN on CPAP 07/14/2015     Current Outpatient Medications   Medication Sig Dispense Refill    gabapentin (NEURONTIN) 100 mg capsule Take  by mouth three (3) times daily. clindamycin (CLEOCIN T) 1 % external solution APPLY PEA SIZED AMOUNT TOPICALLY TO THE AFFECTED AREA TWICE DAILY AS NEEDED      hydroquinone (ESOTERICA, MELQUIN) 4 % topical cream       lidocain-me. salicyl-caps-menth 7.7-89-3.992-4 % ptmd by Apply Externally route. meloxicam (MOBIC) 7.5 mg tablet Take  by mouth daily. EPINEPHrine (EPIPEN) 0.3 mg/0.3 mL injection 0.3 mg by IntraMUSCular route once as needed for Allergic Response. KRILL OIL PO Take 1 Capsule by mouth daily. Raffaele Red brand      allergy injection by SubCUTAneous route every seven (7) days. calcium citrate/vitamin D3 (CITRACAL-D3 PETITES PO) Take 2 Caplet by mouth two (2) times a day. magnesium oxide (MAG-OX) 400 mg tablet Take 400 mg by mouth three (3) times daily.       Breo Ellipta 200-25 mcg/dose inhaler INHALE 1 PUFF BY MOUTH EVERY DAY      hydroCHLOROthiazide (HYDRODIURIL) 25 mg tablet Take 25 mg by mouth in the morning. losartan (COZAAR) 100 mg tablet Take 100 mg by mouth in the morning. vitamin K2 100 mcg cap Take 1 Capsule by mouth in the morning. mv-min-folic acid-lutein (Centrum Silver) 400-250 mcg chew Take 1 Tablet by mouth every other day. multivit,iron,minerals/lutein (CENTRUM SILVER ULTRA WOMEN'S PO) Take 1 Tablet by mouth every other day. Every other day \"mini\"      cholecalciferol, VITAMIN D3, (VITAMIN D3) 5,000 unit tab tablet Take 5,000 Units by mouth in the morning. amLODIPine (NORVASC) 5 mg tablet Take 5 mg by mouth in the morning. TURMERIC, BULK, Take 1 Tablet by mouth three (3) times daily. Patient states 1 capsule of her turmeric is 500 mg      fexofenadine (ALLEGRA) 180 mg tablet Take 180 mg by mouth in the morning. potassium chloride SA (MICRO-K) 10 mEq capsule Take 20 mEq by mouth three (3) times daily. norethindrone acetate (AYGESTIN) 5 mg tablet Take 5 mg by mouth in the morning. coenzyme q10-vitamin e 100-100 mg-unit cap Take 200 mg by mouth daily. montelukast (SINGULAIR) 10 mg tablet Take 10 mg by mouth daily. NASONEX 50 mcg/actuation nasal spray 2 Sprays by Both Nostrils route two (2) times a day. albuterol (PROVENTIL HFA, VENTOLIN HFA, PROAIR HFA) 90 mcg/actuation inhaler Take 2 Puffs by inhalation as needed. cyclobenzaprine (FLEXERIL) 10 mg tablet Take 10 mg by mouth nightly. cinnamon bark 500 mg cap Take 1 Tab by mouth two (2) times a day. Patient states the Cinnamon 1000 mg  is combined with Chromium 400 mg      Biotin 2,500 mcg cap Take 5,000 mcg by mouth two (2) times a day. ascorbic acid, vitamin C, (VITAMIN C) 250 mg tablet Take 500 mg by mouth in the morning. OMALIZUMAB (XOLAIR SC) by SubCUTAneous route every thirty (30) days. ROS      CC: Weight Management      Mallory Gilliam is a 45 y. o. female who is here for her      Weight Metrics 9/21/2022 9/12/2022 8/22/2022 8/17/2022 8/8/2022 7/25/2022 7/12/2022   Weight 275 lb 11.2 oz 281 lb 12.8 oz 288 lb 6.4 oz 291 lb 9.6 oz 295 lb 302 lb 8 oz -   Neck Circ (inches) - - - - - - 15.5   Waist Measure Inches - - - - - - 52   Body Fat % - - - - - - 50.5   BMI 47.32 kg/m2 48.37 kg/m2 49.5 kg/m2 50.05 kg/m2 50.64 kg/m2 51.92 kg/m2 -         Outpatient Medications Marked as Taking for the 9/21/22 encounter (Virtual Visit) with Osiel Lieberman MD   Medication Sig Dispense Refill    gabapentin (NEURONTIN) 100 mg capsule Take  by mouth three (3) times daily. clindamycin (CLEOCIN T) 1 % external solution APPLY PEA SIZED AMOUNT TOPICALLY TO THE AFFECTED AREA TWICE DAILY AS NEEDED      hydroquinone (ESOTERICA, MELQUIN) 4 % topical cream       lidocain-me. salicyl-caps-menth 7.4-86-6.858-6 % ptmd by Apply Externally route. meloxicam (MOBIC) 7.5 mg tablet Take  by mouth daily. EPINEPHrine (EPIPEN) 0.3 mg/0.3 mL injection 0.3 mg by IntraMUSCular route once as needed for Allergic Response. KRILL OIL PO Take 1 Capsule by mouth daily. Raffaele Red brand      allergy injection by SubCUTAneous route every seven (7) days. calcium citrate/vitamin D3 (CITRACAL-D3 PETITES PO) Take 2 Caplet by mouth two (2) times a day. magnesium oxide (MAG-OX) 400 mg tablet Take 400 mg by mouth three (3) times daily. Breo Ellipta 200-25 mcg/dose inhaler INHALE 1 PUFF BY MOUTH EVERY DAY      hydroCHLOROthiazide (HYDRODIURIL) 25 mg tablet Take 25 mg by mouth in the morning. losartan (COZAAR) 100 mg tablet Take 100 mg by mouth in the morning. vitamin K2 100 mcg cap Take 1 Capsule by mouth in the morning. mv-min-folic acid-lutein (Centrum Silver) 400-250 mcg chew Take 1 Tablet by mouth every other day. multivit,iron,minerals/lutein (CENTRUM SILVER ULTRA WOMEN'S PO) Take 1 Tablet by mouth every other day.  Every other day \"mini\"      cholecalciferol, VITAMIN D3, (VITAMIN D3) 5,000 unit tab tablet Take 5,000 Units by mouth in the morning. amLODIPine (NORVASC) 5 mg tablet Take 5 mg by mouth in the morning. TURMERIC, BULK, Take 1 Tablet by mouth three (3) times daily. Patient states 1 capsule of her turmeric is 500 mg      fexofenadine (ALLEGRA) 180 mg tablet Take 180 mg by mouth in the morning. potassium chloride SA (MICRO-K) 10 mEq capsule Take 20 mEq by mouth three (3) times daily. norethindrone acetate (AYGESTIN) 5 mg tablet Take 5 mg by mouth in the morning. coenzyme q10-vitamin e 100-100 mg-unit cap Take 200 mg by mouth daily. montelukast (SINGULAIR) 10 mg tablet Take 10 mg by mouth daily. NASONEX 50 mcg/actuation nasal spray 2 Sprays by Both Nostrils route two (2) times a day. albuterol (PROVENTIL HFA, VENTOLIN HFA, PROAIR HFA) 90 mcg/actuation inhaler Take 2 Puffs by inhalation as needed. cyclobenzaprine (FLEXERIL) 10 mg tablet Take 10 mg by mouth nightly. cinnamon bark 500 mg cap Take 1 Tab by mouth two (2) times a day. Patient states the Cinnamon 1000 mg  is combined with Chromium 400 mg      Biotin 2,500 mcg cap Take 5,000 mcg by mouth two (2) times a day. ascorbic acid, vitamin C, (VITAMIN C) 250 mg tablet Take 500 mg by mouth in the morning. OMALIZUMAB (XOLAIR SC) by SubCUTAneous route every thirty (30) days. Participation   Did you attend clinic and class last week? no    Review of Systems  Since your last visit, have you experienced any complications? no  If yes, please list:     Are you taking an appetite suppressant? no  If so, is there any Chest Pain, Palpitations or Dizziness? HUNGER CONTROL: good    BP Readings from Last 3 Encounters:   09/12/22 124/85   08/22/22 120/79   08/08/22 106/75       SLEEP:  7-8 on cpap    Have you received any other medical care this week? no  If yes, where and for what?        Have you discontinued or changed any medicine or dose of your medicine since your last visit with Dr Amie Hovoer? no  If yes, where and for what? Diet  How many ounces of calorie-free liquids did you consume each day? 64 oz    How many meal replacements did you take each day? 2    Did you have any problems adhering to the program?  no If yes, please explain:      Exercise  Aerobic exercise: 10 min walk 5 days  Resistance exercise: no workouts / week  Any discomfort?  no     If yes, where? Objective  Visit Vitals  Ht 5' 4\" (1.626 m)   Wt 275 lb 11.2 oz (125.1 kg)   BMI 47.32 kg/m²     No LMP recorded. (Menstrual status: Chemically Induced).                     PHYSICAL EXAMINATION:  [ INSTRUCTIONS:  \"[x]\" Indicates a positive item  \"[]\" Indicates a negative item  -- DELETE ALL ITEMS NOT EXAMINED]  Vital Signs: (As obtained by patient/caregiver at home)  Visit Vitals  Ht 5' 4\" (1.626 m)   Wt 275 lb 11.2 oz (125.1 kg)   BMI 47.32 kg/m²        Constitutional: [x] Appears well-developed and well-nourished [x] No apparent distress      [] Abnormal -     Mental status: [x] Alert and awake  [x] Oriented to person/place/time [x] Able to follow commands    [] Abnormal -     Eyes:   EOM    [x]  Normal    [] Abnormal -   Sclera  [x]  Normal    [] Abnormal -          Discharge [x]  None visible   [] Abnormal -     HENT: [x] Normocephalic, atraumatic  [] Abnormal -   [x] Mouth/Throat: Mucous membranes are moist    External Ears [x] Normal  [] Abnormal -    Neck: [x] No visualized mass [] Abnormal -     Pulmonary/Chest: [x] Respiratory effort normal   [x] No visualized signs of difficulty breathing or respiratory distress        [] Abnormal -      Musculoskeletal:   [x] Normal gait with no signs of ataxia         [x] Normal range of motion of neck        [] Abnormal -     Neurological:        [x] No Facial Asymmetry (Cranial nerve 7 motor function) (limited exam due to video visit)          [x] No gaze palsy        [] Abnormal -          Skin:        [x] No significant exanthematous lesions or discoloration noted on facial skin         [] Abnormal -            Psychiatric:       [x] Normal Affect [] Abnormal -        [x] No Hallucinations    Other pertinent observable physical exam findings:-      Assessment / Plan    Encounter Diagnoses   Name Primary? Class 3 severe obesity due to excess calories with serious comorbidity in adult, unspecified BMI ([address] Yes    Essential hypertension     DEANN on CPAP      Diagnoses and all orders for this visit:    1. Class 3 severe obesity due to excess calories with serious comorbidity in adult, unspecified BMI ([address]    Doing great, she has gone from 281 on 9/12/22 to 275 today  Increase to 15 min walks 5 days a week  Cont new direction replacements 2 a day an a meal  Cont using the cpap daily  Rechck in 1 month    2. Essential hypertension  Great control of bp, not low enough to require dose adjustment yet  Cont amlodipine 5 mg a day  Locartan 100 mg a day  Hctz 25  mg a day   Potassium 20 meq tid  3. DEANN on CPAP  Cont on cpap  1. Weight management improved   Progress was reviewed with patient    2. Labs    Latest results reviewed with patient          face to face time with Paolo Flores consisted of counseling & coordinating and/or discussing treatment plans in reference to her obesity The primary encounter diagnosis was Class 3 severe obesity due to excess calories with serious comorbidity in adult, unspecified BMI ([facility] [address] Diagnoses of Essential hypertension and DEANN on CPAP were also pertinent to this visit. Coding Help - Use CPT Codes 67156-51681, 14488-91158 for Established and New Patients respectively, either employing EM elements or Time rules. Other codes (example consult codes) may also apply. We discussed the expected course, resolution and complications of the diagnosis(es) in detail. Medication risks, benefits, costs, interactions, and alternatives were discussed as indicated.   I advised her to contact the office if her condition worsens, changes or fails to improve as anticipated. She expressed understanding with the diagnosis(es) and plan. Pursuant to the emergency declaration under the [facility], [facility] waiver authority and the [employer] Act, this Virtual  Visit was conducted, with patient's consent, to reduce the patient's risk of exposure to COVID-19 and provide continuity of care for an established patient. Services were provided through a video synchronous discussion virtually to substitute for in-person clinic visit.     Shar Wan MD

## 2022-09-28 ENCOUNTER — OFFICE VISIT (OUTPATIENT)
Dept: SURGERY | Age: 52
End: 2022-09-28

## 2022-09-28 DIAGNOSIS — E66.01 CLASS 3 SEVERE OBESITY DUE TO EXCESS CALORIES WITH SERIOUS COMORBIDITY IN ADULT, UNSPECIFIED BMI (HCC): Primary | ICD-10-CM

## 2022-09-29 NOTE — PROGRESS NOTES
[facility]  Metabolic Weight Loss Program        Patient's Name: Celeste Kelly  : 1970    This patient is enrolled in [address] [place] [address] Weight Loss Program and attended the required weekly virtual nutrition class hosted via [employer] today.       Wanda Solano, MS, RD, LDN

## 2022-09-30 ENCOUNTER — CLINICAL SUPPORT (OUTPATIENT)
Dept: SURGERY | Age: 52
End: 2022-09-30

## 2022-09-30 VITALS
BODY MASS INDEX: 46.1 KG/M2 | WEIGHT: 270 LBS | SYSTOLIC BLOOD PRESSURE: 108 MMHG | RESPIRATION RATE: 18 BRPM | DIASTOLIC BLOOD PRESSURE: 74 MMHG | TEMPERATURE: 98.5 F | HEART RATE: 108 BPM | HEIGHT: 64 IN | OXYGEN SATURATION: 97 %

## 2022-09-30 DIAGNOSIS — Z99.89 OSA ON CPAP: ICD-10-CM

## 2022-09-30 DIAGNOSIS — I10 ESSENTIAL HYPERTENSION: ICD-10-CM

## 2022-09-30 DIAGNOSIS — G47.33 OSA ON CPAP: ICD-10-CM

## 2022-09-30 DIAGNOSIS — I89.0 ACQUIRED LYMPHEDEMA OF LOWER EXTREMITY: ICD-10-CM

## 2022-09-30 DIAGNOSIS — E66.01 CLASS 3 SEVERE OBESITY DUE TO EXCESS CALORIES WITH SERIOUS COMORBIDITY IN ADULT, UNSPECIFIED BMI (HCC): Primary | ICD-10-CM

## 2022-09-30 RX ORDER — OMALIZUMAB 150 MG/ML
150 INJECTION, SOLUTION SUBCUTANEOUS
COMMUNITY
Start: 2022-09-09

## 2022-09-30 NOTE — PROGRESS NOTES
Weight Management. 1. Have you been to the ER, urgent care clinic since your last visit? Hospitalized since your last visit? No    2. Have you seen or consulted any other health care providers outside of the [address] [place] [address] since your last visit? Include any pap smears or colon screening. Yes, Neurosurgeon.

## 2022-09-30 NOTE — PROGRESS NOTES
9/12/2022    Progress Note: Weekly Education Class in the [facility] Weight Loss Program         Patient is on Very Low Calorie Diet [] (4 meal replacements per day, 800 kcal/day)      Low Calorie Diet [x] (2-3 meal replacements per day, 6068-3667 kcal/day)    1) Did patient have any new symptoms or physical problems? Yes []    No [x]    If yes, check & comment: weakness [], fatigue [], lightheadedness [], headache [], cramps [], cold intolerance [], hair loss [], diarrhea [], constipation [],  NA [] other:                                 2) Has patient had any medical attention from other providers, urgent care or the emergency room this week? Yes [x]  No []       NA [], If yes, why: neurosurgeon  New medication                                      3) Any other sugar sweetened beverages consumed this week? Yes []  No [x]    4) Did patient have any problems adhering to the diet? Yes []  No [x] NA []    If yes, Vacation [], Celebrations [], Conferences [], Family Reunions [] other:                                                5) How many hours of sleep this week? 6-8.5    (range)  NA []    Number of meal replacements consumed daily? 4  (range)  NA []    Average ounces of water patient consumed daily this week (not including shakes)? 48     (divide the weekly total by 7)    Did you eat any food outside of the program? Yes [x] No []    Physical Activity Over the Past Week:    Cardio exercise: 90 min  Strength exercise: 0 workouts / week  Number of steps walked per day: 8989-8950    How has patient mood overall been this week? Sad [], Happy [x], Stressed [], Tired [], Content [], NA [], other            Medications reconciled by nurse Yes [x]  No[]    Patient was given therapeutic recommendations for any noted side effects of their dietary approach based upon [facility] patient manual per providers recommendation.      9/19/2022    Progress Note: Weekly Education Class in the [facility] Loss Program         Patient is on Very Low Calorie Diet [] (4 meal replacements per day, 800 kcal/day)      Low Calorie Diet [x] (2-3 meal replacements per day, 6740-7250 kcal/day)    1) Did patient have any new symptoms or physical problems? Yes []    No [x]    If yes, check & comment: weakness [], fatigue [], lightheadedness [], headache [], cramps [], cold intolerance [], hair loss [], diarrhea [], constipation [],  NA [] other:                                 2) Has patient had any medical attention from other providers, urgent care or the emergency room this week? Yes []  No [x]       NA [], If yes, why:                                       3) Any other sugar sweetened beverages consumed this week? Yes []  No [x]    4) Did patient have any problems adhering to the diet? Yes []  No [x] NA []    If yes, Vacation [], Celebrations [], Conferences [], Family Reunions [] other:                                                5) How many hours of sleep this week? 6-9.75    (range)  NA []    Number of meal replacements consumed daily? 4  (range)  NA []    Average ounces of water patient consumed daily this week (not including shakes)? 50     (divide the weekly total by 7)    Did you eat any food outside of the program? Yes [x] No []    Physical Activity Over the Past Week:    Cardio exercise: 90 min  Strength exercise: 0 workouts / week  Number of steps walked per day: 6118-5834    How has patient mood overall been this week? Sad [], Happy [], Stressed [], Tired [], Content [], NA [], other Excited           Medications reconciled by nurse Yes [x]  No[]    Patient was given therapeutic recommendations for any noted side effects of their dietary approach based upon [employer] patient manual per providers recommendation.

## 2022-09-30 NOTE — PROGRESS NOTES
[facility]  Metabolic Weight Loss Program        Patient's Name: Sara Chapa  : 1970    This patient is enrolled in [address] [place] [address] Weight Loss Program and attended the required weekly virtual nutrition class hosted via [employer] today.       Naeem Concepcion, MS, RD, LDN

## 2022-10-03 NOTE — PROGRESS NOTES
Nurse note from patient's weekly/ LCD class was reviewed. Pertinent medical concerns were:   reviewed     BP Readings from Last 3 Encounters:   09/30/22 108/74   09/12/22 124/85   08/22/22 120/79       Weight Metrics 9/30/2022 9/21/2022 9/12/2022 8/22/2022 8/17/2022 8/8/2022 7/25/2022   Weight 270 lb 275 lb 11.2 oz 281 lb 12.8 oz 288 lb 6.4 oz 291 lb 9.6 oz 295 lb 302 lb 8 oz   Neck Circ (inches) - - - - - - -   Waist Measure Inches - - - - - - -   Body Fat % - - - - - - -   BMI 46.35 kg/m2 47.32 kg/m2 48.37 kg/m2 49.5 kg/m2 50.05 kg/m2 50.64 kg/m2 51.92 kg/m2       Current Outpatient Medications   Medication Sig Dispense Refill    clindamycin (CLEOCIN T) 1 % external solution APPLY PEA SIZED AMOUNT TOPICALLY TO THE AFFECTED AREA TWICE DAILY AS NEEDED      hydroquinone (ESOTERICA, MELQUIN) 4 % topical cream       lidocain-me. salicyl-caps-menth 1.9-88-5.883-0 % ptmd by Apply Externally route. EPINEPHrine (EPIPEN) 0.3 mg/0.3 mL injection 0.3 mg by IntraMUSCular route once as needed for Allergic Response. KRILL OIL PO Take 1 Capsule by mouth daily. Raffaele Red brand      allergy injection by SubCUTAneous route every seven (7) days. calcium citrate/vitamin D3 (CITRACAL-D3 PETITES PO) Take 2 Caplet by mouth two (2) times a day. magnesium oxide (MAG-OX) 400 mg tablet Take 400 mg by mouth three (3) times daily. Breo Ellipta 200-25 mcg/dose inhaler INHALE 1 PUFF BY MOUTH EVERY DAY      hydroCHLOROthiazide (HYDRODIURIL) 25 mg tablet Take 25 mg by mouth in the morning. losartan (COZAAR) 100 mg tablet Take 100 mg by mouth in the morning. vitamin K2 100 mcg cap Take 1 Capsule by mouth in the morning. mv-min-folic acid-lutein (Centrum Silver) 400-250 mcg chew Take 1 Tablet by mouth every other day. multivit,iron,minerals/lutein (CENTRUM SILVER ULTRA WOMEN'S PO) Take 1 Tablet by mouth every other day.  Every other day \"mini\"      cholecalciferol, VITAMIN D3, (VITAMIN D3) 5,000 unit tab tablet Take 5,000 Units by mouth in the morning. amLODIPine (NORVASC) 5 mg tablet Take 5 mg by mouth in the morning. TURMERIC, BULK, Take 1 Tablet by mouth three (3) times daily. Patient states 1 capsule of her turmeric is 500 mg      fexofenadine (ALLEGRA) 180 mg tablet Take 180 mg by mouth in the morning. potassium chloride SA (MICRO-K) 10 mEq capsule Take 20 mEq by mouth three (3) times daily. norethindrone acetate (AYGESTIN) 5 mg tablet Take 5 mg by mouth in the morning. coenzyme q10-vitamin e 100-100 mg-unit cap Take 200 mg by mouth daily. montelukast (SINGULAIR) 10 mg tablet Take 10 mg by mouth daily. NASONEX 50 mcg/actuation nasal spray 2 Sprays by Both Nostrils route two (2) times a day. albuterol (PROVENTIL HFA, VENTOLIN HFA, PROAIR HFA) 90 mcg/actuation inhaler Take 2 Puffs by inhalation as needed. cyclobenzaprine (FLEXERIL) 10 mg tablet Take 10 mg by mouth nightly. cinnamon bark 500 mg cap Take 1 Tab by mouth two (2) times a day. Patient states the Cinnamon 1000 mg  is combined with Chromium 400 mg      Biotin 2,500 mcg cap Take 5,000 mcg by mouth two (2) times a day. ascorbic acid, vitamin C, (VITAMIN C) 250 mg tablet Take 500 mg by mouth in the morning. Xolair 150 mg/mL syrg 150 mg by SubCUTAneous route every thirty (30) days. gabapentin (NEURONTIN) 100 mg capsule Take 100 mg by mouth nightly.

## 2022-10-12 ENCOUNTER — OFFICE VISIT (OUTPATIENT)
Dept: SURGERY | Age: 52
End: 2022-10-12

## 2022-10-12 DIAGNOSIS — E66.01 CLASS 3 SEVERE OBESITY DUE TO EXCESS CALORIES WITH SERIOUS COMORBIDITY IN ADULT, UNSPECIFIED BMI (HCC): Primary | ICD-10-CM

## 2022-10-13 NOTE — PROGRESS NOTES
[address] [place] [address] [facility] Management Center  Metabolic Weight Loss Program        Patient's Name: Kareen Guevara  : 1970    This patient is enrolled in [address] [place] [address] Weight Loss Program and attended the required weekly virtual nutrition class hosted via [address] [place] [address] today.       Gilberto King, MS, RD, LDN

## 2022-10-14 ENCOUNTER — CLINICAL SUPPORT (OUTPATIENT)
Dept: SURGERY | Age: 52
End: 2022-10-14

## 2022-10-14 VITALS
SYSTOLIC BLOOD PRESSURE: 116 MMHG | TEMPERATURE: 98.9 F | HEART RATE: 98 BPM | RESPIRATION RATE: 18 BRPM | WEIGHT: 270.6 LBS | DIASTOLIC BLOOD PRESSURE: 78 MMHG | BODY MASS INDEX: 46.2 KG/M2 | OXYGEN SATURATION: 97 % | HEIGHT: 64 IN

## 2022-10-14 DIAGNOSIS — G47.33 OSA ON CPAP: ICD-10-CM

## 2022-10-14 DIAGNOSIS — Z99.89 OSA ON CPAP: ICD-10-CM

## 2022-10-14 DIAGNOSIS — I10 ESSENTIAL HYPERTENSION: ICD-10-CM

## 2022-10-14 DIAGNOSIS — I89.0 ACQUIRED LYMPHEDEMA OF LOWER EXTREMITY: ICD-10-CM

## 2022-10-14 DIAGNOSIS — E66.01 CLASS 3 SEVERE OBESITY DUE TO EXCESS CALORIES WITH SERIOUS COMORBIDITY IN ADULT, UNSPECIFIED BMI (HCC): Primary | ICD-10-CM

## 2022-10-14 RX ORDER — MELOXICAM 15 MG/1
15 TABLET ORAL DAILY
COMMUNITY

## 2022-10-14 NOTE — PROGRESS NOTES
Identified pt with two pt identifiers (name and ). Reviewed chart in preparation for visit and have obtained necessary documentation. Yaritza Zelaya is a 45 y.o. female  Chief Complaint   Patient presents with    Weight Management     LCD     Visit Vitals  /78 (BP 1 Location: Right upper arm, BP Patient Position: Sitting, BP Cuff Size: Adult)   Pulse 98   Temp 98.9 °F (37.2 °C) (Oral)   Resp 18   Ht 5' 4\" (1.626 m)   Wt 270 lb 9.6 oz (122.7 kg)   SpO2 97%   BMI 46.45 kg/m²       1. Have you been to the ER, urgent care clinic since your last visit? Hospitalized since your last visit? No    2. Have you seen or consulted any other health care providers outside of the [address] [place] [address] since your last visit? Include any pap smears or colon screening. Yes , Neurosurgeon for MRI follow up    Pt forgot to bring the homework but will email it. Patient attended triage but did not bring homework form. Patient instructed to email or fax completed homework form to us. Patient informed that not bringing the homework form can result in not being seen next time.

## 2022-10-18 NOTE — PROGRESS NOTES
10/3/2022    Progress Note: Weekly Education Class in the [facility] Loss Program         Patient is on Very Low Calorie Diet [] (4 meal replacements per day, 800 kcal/day)      Low Calorie Diet [x] (2-3 meal replacements per day, 2051-8753 kcal/day)    1) Did patient have any new symptoms or physical problems? Yes []    No [x]    If yes, check & comment: weakness [], fatigue [], lightheadedness [], headache [], cramps [], cold intolerance [], hair loss [], diarrhea [], constipation [],  NA [] other:                                 2) Has patient had any medical attention from other providers, urgent care or the emergency room this week? Yes [x]  No []       NA [], If yes, why:   neurosurgeon                                      3) Any other sugar sweetened beverages consumed this week? Yes []  No [x]    4) Did patient have any problems adhering to the diet? Yes [x]  No [] NA []    If yes, Vacation [], Celebrations [], Conferences [], Family Reunions [] other: eating too late                                               5) How many hours of sleep this week? 6.25-8.25    (range)  NA []    Number of meal replacements consumed daily? 4  (range)  NA []    Average ounces of water patient consumed daily this week (not including shakes)? 48     (divide the weekly total by 7)    Did you eat any food outside of the program? Yes [] No []    Physical Activity Over the Past Week:    Cardio exercise: 35 min  Strength exercise: 0 workouts / week  Number of steps walked per day: 5951-6845    How has patient mood overall been this week? Sad [], Happy [], Stressed [], Tired [], Content [], NA [], other Several different moods             Medications reconciled by nurse Yes [x]  No[]    Patient was given therapeutic recommendations for any noted side effects of their dietary approach based upon [facility] patient manual per providers recommendation.      10/10/2022    Progress Note: Weekly Education Class in the [facility]         Patient is on Very Low Calorie Diet [] (4 meal replacements per day, 800 kcal/day)      Low Calorie Diet [x] (2-3 meal replacements per day, 8737-4597 kcal/day)    1) Did patient have any new symptoms or physical problems? Yes []    No [x]    If yes, check & comment: weakness [], fatigue [], lightheadedness [], headache [], cramps [], cold intolerance [], hair loss [], diarrhea [], constipation [],  NA [] other:                                 2) Has patient had any medical attention from other providers, urgent care or the emergency room this week? Yes [x]  No []       NA [], If yes, why: MRI of neck                                      3) Any other sugar sweetened beverages consumed this week? Yes []  No [x]    4) Did patient have any problems adhering to the diet? Yes []  No [x] NA []    If yes, Vacation [], Celebrations [], Conferences [], Family Reunions [] other:                                                5) How many hours of sleep this week? 6.25-7.25    (range)  NA []    Number of meal replacements consumed daily? 4  (range)  NA []    Average ounces of water patient consumed daily this week (not including shakes)? 40     (divide the weekly total by 7)    Did you eat any food outside of the program? Yes [x] No []    Physical Activity Over the Past Week:    Cardio exercise: 140 min  Strength exercise: 0 workouts / week  Number of steps walked per day: 5320-10,988    How has patient mood overall been this week? Sad [], Happy [], Stressed [], Tired [], Content [], NA [], other Several different moods             Medications reconciled by nurse Yes [x]  No[]    Patient was given therapeutic recommendations for any noted side effects of their dietary approach based upon [facility] patient manual per providers recommendation.

## 2022-10-19 ENCOUNTER — OFFICE VISIT (OUTPATIENT)
Dept: SURGERY | Age: 52
End: 2022-10-19

## 2022-10-19 DIAGNOSIS — E66.01 CLASS 3 SEVERE OBESITY DUE TO EXCESS CALORIES WITH SERIOUS COMORBIDITY IN ADULT, UNSPECIFIED BMI (HCC): Primary | ICD-10-CM

## 2022-10-21 NOTE — PROGRESS NOTES
[facility]  Metabolic Weight Loss Program        Patient's Name: Gerardo Tesfaye  : 1970    This patient is enrolled in [address] [place] [address] Weight Loss Program and attended the required weekly virtual nutrition class hosted via [address] [place] [address] today.       Juan Pablo Escobar, MS, RD, LDN

## 2022-10-27 ENCOUNTER — OFFICE VISIT (OUTPATIENT)
Dept: SURGERY | Age: 52
End: 2022-10-27

## 2022-10-27 DIAGNOSIS — E66.01 CLASS 3 SEVERE OBESITY DUE TO EXCESS CALORIES WITH SERIOUS COMORBIDITY IN ADULT, UNSPECIFIED BMI (HCC): Primary | ICD-10-CM

## 2022-10-31 NOTE — PROGRESS NOTES
[facility]  Metabolic Weight Loss Program        Patient's Name: Archie Kay  : 1970    This patient is enrolled in [address] [place] [address] Weight Loss Program and attended the required weekly virtual nutrition class hosted via [employer] today.       Archie Woodard RD

## 2022-10-31 NOTE — PROGRESS NOTES
Nurse note from patient's weekly  LCD / Maintenance class was reviewed. Pertinent medical concerns were:   reviewed     BP Readings from Last 3 Encounters:   10/14/22 116/78   09/30/22 108/74   09/12/22 124/85       Weight Metrics 10/14/2022 9/30/2022 9/21/2022 9/12/2022 8/22/2022 8/17/2022 8/8/2022   Weight 270 lb 9.6 oz 270 lb 275 lb 11.2 oz 281 lb 12.8 oz 288 lb 6.4 oz 291 lb 9.6 oz 295 lb   Neck Circ (inches) - - - - - - -   Waist Measure Inches - - - - - - -   Body Fat % - - - - - - -   BMI 46.45 kg/m2 46.35 kg/m2 47.32 kg/m2 48.37 kg/m2 49.5 kg/m2 50.05 kg/m2 50.64 kg/m2       Current Outpatient Medications   Medication Sig Dispense Refill    meloxicam (MOBIC) 15 mg tablet Take 15 mg by mouth daily. Xolair 150 mg/mL syrg 150 mg by SubCUTAneous route every thirty (30) days. gabapentin (NEURONTIN) 100 mg capsule Take 100 mg by mouth nightly. clindamycin (CLEOCIN T) 1 % external solution APPLY PEA SIZED AMOUNT TOPICALLY TO THE AFFECTED AREA TWICE DAILY AS NEEDED      hydroquinone (ESOTERICA, MELQUIN) 4 % topical cream       lidocain-me. salicyl-caps-menth 9.6-71-9.851-3 % ptmd by Apply Externally route every eight (8) hours as needed. EPINEPHrine (EPIPEN) 0.3 mg/0.3 mL injection 0.3 mg by IntraMUSCular route once as needed for Allergic Response. KRILL OIL PO Take 1 Capsule by mouth daily. Raffaele Red brand      allergy injection by SubCUTAneous route every seven (7) days. calcium citrate/vitamin D3 (CITRACAL-D3 PETITES PO) Take 2 Caplet by mouth two (2) times a day. magnesium oxide (MAG-OX) 400 mg tablet Take 400 mg by mouth three (3) times daily. Breo Ellipta 200-25 mcg/dose inhaler INHALE 1 PUFF BY MOUTH EVERY DAY      hydroCHLOROthiazide (HYDRODIURIL) 25 mg tablet Take 25 mg by mouth in the morning. losartan (COZAAR) 100 mg tablet Take 100 mg by mouth in the morning. vitamin K2 100 mcg cap Take 1 Capsule by mouth in the morning.       mv-min-folic acid-lutein (Centrum Silver) 400-250 mcg chew Take 1 Tablet by mouth every other day. multivit,iron,minerals/lutein (CENTRUM SILVER ULTRA WOMEN'S PO) Take 1 Tablet by mouth every other day. Every other day \"mini\"      cholecalciferol, VITAMIN D3, (VITAMIN D3) 5,000 unit tab tablet Take 5,000 Units by mouth in the morning. amLODIPine (NORVASC) 5 mg tablet Take 5 mg by mouth in the morning. TURMERIC, BULK, Take 1 Tablet by mouth three (3) times daily. Patient states 1 capsule of her turmeric is 500 mg      fexofenadine (ALLEGRA) 180 mg tablet Take 180 mg by mouth in the morning. potassium chloride SA (MICRO-K) 10 mEq capsule Take 20 mEq by mouth three (3) times daily. norethindrone acetate (AYGESTIN) 5 mg tablet Take 5 mg by mouth in the morning. coenzyme q10-vitamin e 100-100 mg-unit cap Take 200 mg by mouth daily. montelukast (SINGULAIR) 10 mg tablet Take 10 mg by mouth daily. NASONEX 50 mcg/actuation nasal spray 2 Sprays by Both Nostrils route two (2) times a day. albuterol (PROVENTIL HFA, VENTOLIN HFA, PROAIR HFA) 90 mcg/actuation inhaler Take 2 Puffs by inhalation as needed. cyclobenzaprine (FLEXERIL) 10 mg tablet Take 10 mg by mouth nightly. cinnamon bark 500 mg cap Take 1 Tab by mouth two (2) times a day. Patient states the Cinnamon 1000 mg  is combined with Chromium 400 mg      Biotin 2,500 mcg cap Take 5,000 mcg by mouth two (2) times a day. ascorbic acid, vitamin C, (VITAMIN C) 250 mg tablet Take 500 mg by mouth in the morning.

## 2022-11-02 ENCOUNTER — OFFICE VISIT (OUTPATIENT)
Dept: SURGERY | Age: 52
End: 2022-11-02

## 2022-11-02 DIAGNOSIS — E66.01 CLASS 3 SEVERE OBESITY DUE TO EXCESS CALORIES WITH SERIOUS COMORBIDITY IN ADULT, UNSPECIFIED BMI (HCC): Primary | ICD-10-CM

## 2022-11-09 ENCOUNTER — OFFICE VISIT (OUTPATIENT)
Dept: SURGERY | Age: 52
End: 2022-11-09

## 2022-11-09 DIAGNOSIS — E66.01 CLASS 3 SEVERE OBESITY DUE TO EXCESS CALORIES WITH SERIOUS COMORBIDITY IN ADULT, UNSPECIFIED BMI (HCC): Primary | ICD-10-CM

## 2022-11-11 ENCOUNTER — CLINICAL SUPPORT (OUTPATIENT)
Dept: SURGERY | Age: 52
End: 2022-11-11

## 2022-11-11 VITALS
DIASTOLIC BLOOD PRESSURE: 82 MMHG | BODY MASS INDEX: 44.42 KG/M2 | OXYGEN SATURATION: 97 % | TEMPERATURE: 99 F | HEART RATE: 97 BPM | RESPIRATION RATE: 18 BRPM | HEIGHT: 64 IN | WEIGHT: 260.2 LBS | SYSTOLIC BLOOD PRESSURE: 122 MMHG

## 2022-11-11 DIAGNOSIS — E66.01 MORBID OBESITY WITH BMI OF 40.0-44.9, ADULT (HCC): Primary | ICD-10-CM

## 2022-11-11 NOTE — PROGRESS NOTES
Week 1    Progress Note: Weekly Education Class in the [facility] Loss Program         Patient is on Very Low Calorie Diet [] (4 meal replacements per day, 800 kcal/day)      Low Calorie Diet [x] (2-3 meal replacements per day, 4367-3122 kcal/day)    1) Did patient have any new symptoms or physical problems? Yes []    No [x]    If yes, check & comment: weakness [], fatigue [], lightheadedness [], headache [], cramps [], cold intolerance [], hair loss [], diarrhea [], constipation [],  NA [] other:                                 2) Has patient had any medical attention from other providers, urgent care or the emergency room this week? Yes []  No [x]       NA [], If yes, why:                                       3) Any other sugar sweetened beverages consumed this week? Yes []  No [x]    4) Did patient have any problems adhering to the diet? Yes []  No [x] NA []    If yes, Vacation [], Celebrations [], Conferences [], Family Reunions [] other:                                                5) How many hours of sleep this week? 50    (range)  NA []    Number of meal replacements consumed daily? 2 (range)  NA []    Average ounces of water patient consumed daily this week (not including shakes)? 70     (divide the weekly total by 7)    Did you eat any food outside of the program? Yes [] No [x]    Physical Activity Over the Past Week:    Cardio exercise: 150 min  Strength exercise: 0 workouts / week  Number of steps walked per day: 5,936    How has patient mood overall been this week? Sad [], Happy [], Stressed [], Tired [], Content [], NA [], other            Medications reconciled by nurse Yes [x]  No[]    Patient was given therapeutic recommendations for any noted side effects of their dietary approach based upon [facility] patient manual per providers recommendation.        Week 2        Progress Note: Weekly Education Class in the [facility] Loss Program         Patient is on Very Low Calorie Diet [] (4 meal replacements per day, 800 kcal/day)      Low Calorie Diet [x] (2-3 meal replacements per day, 3791-6908 kcal/day)    1) Did patient have any new symptoms or physical problems? Yes []    No [x]    If yes, check & comment: weakness [], fatigue [], lightheadedness [], headache [], cramps [], cold intolerance [], hair loss [], diarrhea [], constipation [],  NA [] other:                                 2) Has patient had any medical attention from other providers, urgent care or the emergency room this week? Yes []  No [x]       NA [], If yes, why:                                       3) Any other sugar sweetened beverages consumed this week? Yes []  No [x]    4) Did patient have any problems adhering to the diet? Yes []  No [x] NA []    If yes, Vacation [], Celebrations [], Conferences [], Family Reunions [] other:                                               5) How many hours of sleep this week?     (range)  NA []    Number of meal replacements consumed daily? 2 (range)  NA []    Average ounces of water patient consumed daily this week (not including shakes)? 32  (divide the weekly total by 7)    Did you eat any food outside of the program? Yes [] No [x]    Physical Activity Over the Past Week:    Cardio exercise: 40 min  Strength exercise: 0 workouts / week  Number of steps walked per day: 3,565    How has patient mood overall been this week? Sad [], Happy [], Stressed [], Tired [], Content [], NA [], other eager excited           Medications reconciled by nurse Yes [x]  No[]    Patient was given therapeutic recommendations for any noted side effects of their dietary approach based upon [facility] patient manual per providers recommendation.

## 2022-11-11 NOTE — COMMUNICATION BODY
Identified pt with two pt identifiers (name and ). Reviewed chart in preparation for visit and have obtained necessary documentation. Angel Parkinson is a 45 y.o. female  Chief Complaint   Patient presents with    Weight Management     Visit Vitals  Ht 5' 4\" (1.626 m)   Wt 260 lb 3.2 oz (118 kg)   BMI 44.66 kg/m²       1. Have you been to the ER, urgent care clinic since your last visit? Hospitalized since your last visit? No    2. Have you seen or consulted any other health care providers outside of the [address] [place] [address] since your last visit? Include any pap smears or colon screening.  No

## 2022-11-16 ENCOUNTER — OFFICE VISIT (OUTPATIENT)
Dept: SURGERY | Age: 52
End: 2022-11-16

## 2022-11-16 DIAGNOSIS — E66.01 CLASS 3 SEVERE OBESITY DUE TO EXCESS CALORIES WITH SERIOUS COMORBIDITY IN ADULT, UNSPECIFIED BMI (HCC): Primary | ICD-10-CM

## 2022-11-30 NOTE — PROGRESS NOTES
[facility]  Metabolic Weight Loss Program        Patient's Name: Chanda Tapia  : 1970    This patient is enrolled in [address] [place] [address] Weight Loss Program and attended the required weekly virtual nutrition class hosted via [employer].       Zainab Carmen, MS, RD, LDN

## 2022-11-30 NOTE — PROGRESS NOTES
[facility] Center  Metabolic Weight Loss Program        Patient's Name: Camryn Vásquez  : 1970    This patient is enrolled in [address] [place] [address] Weight Loss Program and attended the required weekly virtual nutrition class hosted via [employer].       Lalitha Maher, MS, RD, LDN

## 2022-11-30 NOTE — PROGRESS NOTES
[facility]  Metabolic Weight Loss Program        Patient's Name: Ingrid Ansari  : 1970    This patient is enrolled in [address] [place] [address] Weight Loss Program and attended the required weekly virtual nutrition class hosted via [address] [place] [address] today.       Quinton Fermin MS, RD, LDN

## 2022-12-01 ENCOUNTER — OFFICE VISIT (OUTPATIENT)
Dept: SURGERY | Age: 52
End: 2022-12-01

## 2022-12-01 DIAGNOSIS — E66.01 CLASS 3 SEVERE OBESITY DUE TO EXCESS CALORIES WITH SERIOUS COMORBIDITY IN ADULT, UNSPECIFIED BMI (HCC): Primary | ICD-10-CM

## 2022-12-05 NOTE — PROGRESS NOTES
[facility]  Metabolic Weight Loss Program        Patient's Name: Edwin Solomon  : 1970    This patient is enrolled in [address] [place] [address] Weight Loss Program and attended the required weekly virtual nutrition class hosted via [employer].       Jayden Camacho, MS, RD, LDN

## 2022-12-07 ENCOUNTER — OFFICE VISIT (OUTPATIENT)
Dept: SURGERY | Age: 52
End: 2022-12-07

## 2022-12-07 DIAGNOSIS — E66.01 CLASS 3 SEVERE OBESITY DUE TO EXCESS CALORIES WITH SERIOUS COMORBIDITY IN ADULT, UNSPECIFIED BMI (HCC): Primary | ICD-10-CM

## 2022-12-08 NOTE — PROGRESS NOTES
[facility]  Metabolic Weight Loss Program        Patient's Name: Milo Harvey  : 1970    This patient is enrolled in [address] [place] [address] Weight Loss Program and attended the required weekly virtual nutrition class hosted via [employer].       Carolynne Oppenheim, MS, RD, LDN

## 2022-12-14 ENCOUNTER — CLINICAL SUPPORT (OUTPATIENT)
Dept: SURGERY | Age: 52
End: 2022-12-14

## 2022-12-14 VITALS
SYSTOLIC BLOOD PRESSURE: 131 MMHG | HEART RATE: 94 BPM | DIASTOLIC BLOOD PRESSURE: 84 MMHG | WEIGHT: 256.7 LBS | OXYGEN SATURATION: 97 % | BODY MASS INDEX: 43.83 KG/M2 | HEIGHT: 64 IN | TEMPERATURE: 98.6 F | RESPIRATION RATE: 18 BRPM

## 2022-12-14 DIAGNOSIS — E66.01 MORBID OBESITY WITH BMI OF 40.0-44.9, ADULT (HCC): Primary | ICD-10-CM

## 2022-12-14 NOTE — PROGRESS NOTES
Weight Management. 1. Have you been to the ER, urgent care clinic since your last visit? Hospitalized since your last visit? No    2. Have you seen or consulted any other health care providers outside of the [address] [place] [address] since your last visit? Include any pap smears or colon screening. PCP for regular follow-up; mammogram.    Patient attended triage but did not bring homework form. Patient instructed to email or fax completed homework form to us. Patient informed that not bringing the homework form can result in not being seen next time.

## 2022-12-15 ENCOUNTER — OFFICE VISIT (OUTPATIENT)
Dept: SURGERY | Age: 52
End: 2022-12-15

## 2022-12-15 DIAGNOSIS — E66.01 CLASS 3 SEVERE OBESITY DUE TO EXCESS CALORIES WITH SERIOUS COMORBIDITY IN ADULT, UNSPECIFIED BMI (HCC): Primary | ICD-10-CM

## 2022-12-19 NOTE — PROGRESS NOTES
[facility]  Metabolic Weight Loss Program        Patient's Name: Waleska Rodriguez  : 1970    This patient is enrolled in [address] [place] [address] Weight Loss Program and attended the required weekly virtual nutrition class hosted via [employer] today.       Amaury Nurse, MS, RD, LDN

## 2022-12-20 ENCOUNTER — TELEPHONE (OUTPATIENT)
Dept: SURGERY | Age: 52
End: 2022-12-20

## 2022-12-20 NOTE — TELEPHONE ENCOUNTER
Called patient re: patient needs to schedule an appointment with Dr. Harless Bence for medical weight loss program compliance. Patient did not answer so I left a vmail with our contact information.

## 2023-01-04 ENCOUNTER — OFFICE VISIT (OUTPATIENT)
Dept: SURGERY | Age: 53
End: 2023-01-04

## 2023-01-04 DIAGNOSIS — E66.01 CLASS 3 SEVERE OBESITY DUE TO EXCESS CALORIES WITH SERIOUS COMORBIDITY IN ADULT, UNSPECIFIED BMI (HCC): Primary | ICD-10-CM

## 2023-01-05 NOTE — PROGRESS NOTES
[facility]  Metabolic Weight Loss Program        Patient's Name: Osiris Harrington  : 1970    This patient is enrolled in [address] [place] [address] Weight Loss Program and attended the required weekly virtual nutrition class hosted via [employer]       Adriana Tolliver, MS, RD, LDN

## 2023-01-17 ENCOUNTER — OFFICE VISIT (OUTPATIENT)
Dept: SURGERY | Age: 53
End: 2023-01-17
Payer: COMMERCIAL

## 2023-01-17 VITALS
DIASTOLIC BLOOD PRESSURE: 77 MMHG | RESPIRATION RATE: 17 BRPM | BODY MASS INDEX: 44.56 KG/M2 | HEIGHT: 64 IN | TEMPERATURE: 97.4 F | SYSTOLIC BLOOD PRESSURE: 134 MMHG | HEART RATE: 88 BPM | OXYGEN SATURATION: 98 % | WEIGHT: 261 LBS

## 2023-01-17 DIAGNOSIS — Z72.4 INAPPROPRIATE DIET OR EATING HABITS: ICD-10-CM

## 2023-01-17 DIAGNOSIS — E66.01 MORBID OBESITY (HCC): Primary | ICD-10-CM

## 2023-01-17 DIAGNOSIS — Z71.3 ENCOUNTER FOR WEIGHT LOSS COUNSELING: ICD-10-CM

## 2023-01-17 RX ORDER — AMOXICILLIN AND CLAVULANATE POTASSIUM 875; 125 MG/1; MG/1
TABLET, FILM COATED ORAL
COMMUNITY
Start: 2023-01-06

## 2023-01-17 NOTE — PROGRESS NOTES
Weight Loss Progress Note: Initial Physician Visit      Gaetano Osborne is a 45 y.o. female with BMI 44.7 who is here for her Initial Evaluation for the medical weight loss LCD program. Patient has previously done the [facility] program twice before with the most recent being in 7/2022, however because the [facility] program was on a hiatus the patient was going to the [place] location and seeing Dr. Leoncio Friend, but because of her job now wants to establish care here locally. CC: Obesity    Weight History  Current weight 261lbs  Goal weight 170lbs   Highest weight 315lbs   (See weight gain time line scanned into media section of chart)      Food intolerances (gas, bloating, diarrhea, vomiting)? None. Weight loss History  How many weight loss attempts have you had? At least 2 times. Which program were you most successful doing? [facility] twice - lost 90lbs in 2015 and lost another 50lbs in 2022. Significant Medical History    Have you ever taken appetite suppressants? No.   If yes: Rx or OTC? N/A   If yes; Any negative side effects? Ever diagnosed with sleep apnea or put on CPAP? Yes and wears CPAP nightly. Ever had bariatric surgery?: No.    Pregnant or planning on becoming pregnant w/in 6 months: No.        Significant Psychosocial History   Any history of drug abuse or dependence: No.  Current Major Lifestyle Changes: No.  Any potential unsupportive people: No.  Why are you starting a weight loss program now? Patient desires a healthier lifestyle and to feel better fitting in her clothes. Are you ready? Yes. History of binge eating disorder or anorexia: No.  If yes, are you currently being treated? Social History  Social History     Tobacco Use    Smoking status: Never    Smokeless tobacco: Never   Substance Use Topics    Alcohol use: No     How many times a week do you eat out? 5 times weekly. Do you drink any EtOH? No.   If so, how much? N/A    Exercise  How many days a week do you currently exercise?  0 days. Have you ever been told by a physician not to exercise? Objective  Visit Vitals  /77 (BP Patient Position: Sitting)   Pulse 88   Temp 97.4 °F (36.3 °C) (Temporal)   Resp 17   Ht 5' 4\" (1.626 m)   Wt 261 lb (118.4 kg)   SpO2 98%   BMI 44.80 kg/m²       Waist Circumference: 47.5in  Arm Circumference: 12in  Thigh Circumference: 24.5in  Percent Body Fat: 48.2%  Weight Metrics 1/17/2023 12/14/2022 11/11/2022 10/14/2022 9/30/2022 9/21/2022 9/12/2022   Weight 261 lb 256 lb 11.2 oz 260 lb 3.2 oz 270 lb 9.6 oz 270 lb 275 lb 11.2 oz 281 lb 12.8 oz   Neck Circ (inches) - - - - - - -   Waist Measure Inches - - - - - - -   Body Fat % - - - - - - -   BMI 44.8 kg/m2 44.06 kg/m2 44.66 kg/m2 46.45 kg/m2 46.35 kg/m2 47.32 kg/m2 48.37 kg/m2         Labs: None    Review of Systems  Review of Systems   Constitutional: Negative. HENT: Negative. Eyes: Negative. Respiratory: Negative. Cardiovascular: Negative. Gastrointestinal: Negative. Musculoskeletal: Negative. Skin: Negative. Neurological: Negative. Psychiatric/Behavioral: Negative. Physical Exam  Physical Exam  Constitutional:       Appearance: She is obese. HENT:      Head: Normocephalic and atraumatic. Mouth/Throat:      Mouth: Mucous membranes are moist.   Cardiovascular:      Rate and Rhythm: Normal rate and regular rhythm. Pulses: Normal pulses. Heart sounds: Normal heart sounds. Pulmonary:      Effort: Pulmonary effort is normal.      Breath sounds: Normal breath sounds. Abdominal:      Palpations: Abdomen is soft. Musculoskeletal:         General: Normal range of motion. Cervical back: Normal range of motion and neck supple. Skin:     General: Skin is warm and dry. Neurological:      General: No focal deficit present. Mental Status: She is alert and oriented to person, place, and time.    Psychiatric:         Mood and Affect: Mood normal.         Behavior: Behavior normal.          Assessment & Plan  Based on his history and exam, Ilene Infante is a good candidate for the Non-[facility] Weight Loss LCD Program. Patient has previously done the [facility] in 2015 losing 90lbs and again in 2022 losing 50lbs. She has done the most recent program in [place] because the [facility] program was on a hiatus, but because of logistical reasons wants to re-establish with the [facility] program.      Dietary recall reflects that a typical breakfast for the patient  consists of a meal replacement shakes, but then she sabotages that by also consuming candy such as [employer] pieces. Lunch is typically a salad from [address] with a protein and vegetables, or a meal replacement shake, or [employer] rotisserie chicken. Dinner is typically again [employer] and a double portion of broccoli. Patient states that she is a snacker and likes chocolate, and potato chips. She states that she consumes 4-6 bottles of [employer] water and will also consume a bottle or two of plain water daily. She does not currently have a regular exercise routine. Discussed with patient that the LCD program is a good fit for her as she is already familiar with it having done it twice before now. Based on her admission of consuming sweets even though she is currently doing the [facility] program we discussed adding a therapist on to the team to help her examine her relationship to food and why she seems to be self sabotaging her weight loss efforts, which the patient was very amenable to. She is an  at an elementary school so explained to her how high levels of stress increases cortisol and cortisol leads to overall weight gain.  Explained to patient that implementing a regular exercise regimen would be beneficial as well so advised her to track her daily steps with a a target of 6k-10k at least 3 days per week until such time as she is ready to build onto her exercise regimen. Diet Plan: LCD      Activity Plan: 6k-10k steps daily 3x weekly or more    Medication Plan: None      Therapy Referral: Yes [facility]      There are no Patient Instructions on file for this visit. There were no encounter diagnoses.           RM Walker

## 2023-03-02 RX ORDER — POTASSIUM CHLORIDE 750 MG/1
CAPSULE, EXTENDED RELEASE ORAL
COMMUNITY
Start: 2023-01-29

## 2023-03-02 RX ORDER — CYCLOBENZAPRINE HCL 10 MG
TABLET ORAL
COMMUNITY
Start: 2023-02-16

## 2023-03-02 RX ORDER — AMLODIPINE BESYLATE 5 MG/1
TABLET ORAL
COMMUNITY
Start: 2022-11-29

## 2023-03-02 RX ORDER — GABAPENTIN 300 MG/1
CAPSULE ORAL
COMMUNITY
Start: 2022-12-14

## 2023-03-02 RX ORDER — PSEUDOEPHED/ACETAMINOPH/DIPHEN 30MG-500MG
TABLET ORAL
COMMUNITY
Start: 2023-01-25

## 2023-03-02 RX ORDER — CODEINE PHOSPHATE AND GUAIFENESIN 10; 100 MG/5ML; MG/5ML
SOLUTION ORAL
COMMUNITY
Start: 2023-01-03

## 2023-03-02 RX ORDER — MELOXICAM 15 MG/1
TABLET ORAL
COMMUNITY
Start: 2022-12-14

## 2023-03-02 RX ORDER — OMALIZUMAB 150 MG/ML
INJECTION, SOLUTION SUBCUTANEOUS
COMMUNITY
Start: 2022-12-20

## 2023-03-02 RX ORDER — METHOCARBAMOL 500 MG/1
TABLET, FILM COATED ORAL
COMMUNITY
Start: 2023-01-25

## 2023-03-02 RX ORDER — LOSARTAN POTASSIUM 100 MG/1
TABLET ORAL
COMMUNITY
Start: 2023-02-15

## 2023-03-02 RX ORDER — MONTELUKAST SODIUM 10 MG/1
TABLET ORAL
COMMUNITY
Start: 2023-01-27

## 2023-03-22 ENCOUNTER — TELEPHONE (OUTPATIENT)
Age: 53
End: 2023-03-22

## 2023-03-22 NOTE — TELEPHONE ENCOUNTER
Email from patient re: missed monthly provider visit. Tone Shell,      I had to cancel my last appointment due to illness. Since my recent bout with COVID,  I continue to pete ongoing upper respiratory infections. I'm still on antibiotics, prednisone, inhalers, and several prescription cough medicines. I plan to return for my appointment by the end of the month. Thank you so much for checking on me.      Kind regards,  Bernette Rinne

## 2023-03-30 NOTE — PATIENT INSTRUCTIONS
Body Mass Index: Care Instructions  Your Care Instructions    Body mass index (BMI) can help you see if your weight is raising your risk for health problems. It uses a formula to compare how much you weigh with how tall you are. · A BMI lower than 18.5 is considered underweight. · A BMI between 18.5 and 24.9 is considered healthy. · A BMI between 25 and 29.9 is considered overweight. A BMI of 30 or higher is considered obese. If your BMI is in the normal range, it means that you have a lower risk for weight-related health problems. If your BMI is in the overweight or obese range, you may be at increased risk for weight-related health problems, such as high blood pressure, heart disease, stroke, arthritis or joint pain, and diabetes. If your BMI is in the underweight range, you may be at increased risk for health problems such as fatigue, lower protection (immunity) against illness, muscle loss, bone loss, hair loss, and hormone problems. BMI is just one measure of your risk for weight-related health problems. You may be at higher risk for health problems if you are not active, you eat an unhealthy diet, or you drink too much alcohol or use tobacco products. Follow-up care is a key part of your treatment and safety. Be sure to make and go to all appointments, and call your doctor if you are having problems. It's also a good idea to know your test results and keep a list of the medicines you take. How can you care for yourself at home? · Practice healthy eating habits. This includes eating plenty of fruits, vegetables, whole grains, lean protein, and low-fat dairy. · If your doctor recommends it, get more exercise. Walking is a good choice. Bit by bit, increase the amount you walk every day. Try for at least 30 minutes on most days of the week. · Do not smoke. Smoking can increase your risk for health problems. If you need help quitting, talk to your doctor about stop-smoking programs and medicines. Patient reports that she ran out of her Darryl sensors and has been using her [employer] prime glucometer since December 2022. Meter read 02/01 as the date and 0545 as the time at 1337.  The time and dates on the meter were also out of sequence.   The date and time were corrected.        These can increase your chances of quitting for good. · Limit alcohol to 2 drinks a day for men and 1 drink a day for women. Too much alcohol can cause health problems. If you have a BMI higher than 25  · Your doctor may do other tests to check your risk for weight-related health problems. This may include measuring the distance around your waist. A waist measurement of more than 40 inches in men or 35 inches in women can increase the risk of weight-related health problems. · Talk with your doctor about steps you can take to stay healthy or improve your health. You may need to make lifestyle changes to lose weight and stay healthy, such as changing your diet and getting regular exercise. If you have a BMI lower than 18.5  · Your doctor may do other tests to check your risk for health problems. · Talk with your doctor about steps you can take to stay healthy or improve your health. You may need to make lifestyle changes to gain or maintain weight and stay healthy, such as getting more healthy foods in your diet and doing exercises to build muscle. Where can you learn more? Go to http://ashley-ivan.info/. Enter S176 in the search box to learn more about \"Body Mass Index: Care Instructions. \"  Current as of: January 23, 2017  Content Version: 11.3  © 6589-4757 [employer]. Care instructions adapted under license by [employer] (which disclaims liability or warranty for this information). If you have questions about a medical condition or this instruction, always ask your healthcare professional. [place] [address] any warranty or liability for your use of this information.

## 2023-05-09 ENCOUNTER — CLINICAL DOCUMENTATION (OUTPATIENT)
Age: 53
End: 2023-05-09

## 2023-05-09 ENCOUNTER — OFFICE VISIT (OUTPATIENT)
Age: 53
End: 2023-05-09
Payer: COMMERCIAL

## 2023-05-09 VITALS
TEMPERATURE: 97.3 F | HEIGHT: 64 IN | RESPIRATION RATE: 17 BRPM | DIASTOLIC BLOOD PRESSURE: 84 MMHG | HEART RATE: 89 BPM | BODY MASS INDEX: 49.34 KG/M2 | WEIGHT: 289 LBS | SYSTOLIC BLOOD PRESSURE: 139 MMHG | OXYGEN SATURATION: 98 %

## 2023-05-09 DIAGNOSIS — Z71.3 ENCOUNTER FOR DIETARY CONSULTATION: ICD-10-CM

## 2023-05-09 DIAGNOSIS — Z71.3 WEIGHT LOSS COUNSELING, ENCOUNTER FOR: ICD-10-CM

## 2023-05-09 DIAGNOSIS — E66.01 MORBID OBESITY (HCC): Primary | ICD-10-CM

## 2023-05-09 PROCEDURE — 3075F SYST BP GE 130 - 139MM HG: CPT | Performed by: NURSE PRACTITIONER

## 2023-05-09 PROCEDURE — 3079F DIAST BP 80-89 MM HG: CPT | Performed by: NURSE PRACTITIONER

## 2023-05-09 PROCEDURE — 99213 OFFICE O/P EST LOW 20 MIN: CPT | Performed by: NURSE PRACTITIONER

## 2023-05-09 RX ORDER — PHENTERMINE HYDROCHLORIDE 37.5 MG/1
37.5 TABLET ORAL
Qty: 30 TABLET | Refills: 0 | Status: SHIPPED | OUTPATIENT
Start: 2023-05-09 | End: 2023-06-08

## 2023-05-09 ASSESSMENT — ENCOUNTER SYMPTOMS
ALLERGIC/IMMUNOLOGIC NEGATIVE: 1
GASTROINTESTINAL NEGATIVE: 1
EYES NEGATIVE: 1
RESPIRATORY NEGATIVE: 1

## 2023-05-09 NOTE — PROGRESS NOTES
Patient is outside of attendance policy for education, medical monitoring, and/or physician followups Patient is automatically discharged from the Medical Weight Loss Program based on the required attendance policy and signed agreement to the policies upon enrollment. The following appointtment will be cancelled and patient will be contacted.  Patient will need to attend another orientation to reenroll in the program.     Future Appointments   Date Time Provider Genia Rios   6/14/2023  3:30 PM AMOS Rodriguez - CNP [facility]

## 2023-05-09 NOTE — PROGRESS NOTES
[facility] Progress Note:   F/up Provider Visit    Chief Complaint   Patient presents with    Follow-up     Medical Weight Loss - LCD        Pooja Lynn is a 45 y.o. female who is here for her  f/up medical weight loss visit for the LCD program. Patient has been on steroids until approximately two weeks ago and as a result is up 28lbs and 4.5in overall. Patient is back on the LCD program and we will enhance that adding adjunctive treatment of phentermine. +28 lbs gained. Arm Circumference: 12.5in  Waist Circumference: 50in  Thigh Circumference: 26in  Percent Body Fat: 50.4%      Progress and challenges thus far. Patient developed COVID in 2022 and though she recovered she has developed several upper respiratory infections for which she was placed on prednisone until 2 weeks ago. As a result she has experienced massive weight gain. Patient is back on track with the LCD program and will now also have adjunctive treatment of phentermine to try and aggressively reduce the weight that she has gained. Diet? LCD    Did you have any problems adhering to the program last week? No.  If yes, please explain:     Since your last visit, have you experienced any complications? Patient states she starts off her morning with a shake but then her diet \"goes downhill after that\". Have you received any other medical care this week? No.  If yes, where and for what? Have you had any change in your medications since your last visit? No.  If yes what? Are you taking an appetite suppressant? No.  If yes:  Do you need a refill? BP Readings from Last 3 Encounters:   05/09/23 139/84   01/17/23 134/77   12/14/22 131/84        Eating Habits Over Last Week:  Did you take in 64 oz of non-caloric fluids? No.    Did you consume your prescribed meal replacement regimen each day?  No.      Physical Activity Over the Past Week:    Aerobic exercise: 0 min  Resistance exercise: 0 workouts /

## 2023-07-13 ENCOUNTER — TELEPHONE (OUTPATIENT)
Age: 53
End: 2023-07-13

## 2023-07-13 NOTE — TELEPHONE ENCOUNTER
Pt called and stated she would like to restart the program and would like a call back on how to do so.

## 2023-07-14 NOTE — TELEPHONE ENCOUNTER
Called patient re: how to get back in the [address] [place] [address] weight loss program. Patient did not answer so I left a vmail with our contact information.

## 2023-08-07 ENCOUNTER — OFFICE VISIT (OUTPATIENT)
Age: 53
End: 2023-08-07
Payer: COMMERCIAL

## 2023-08-07 VITALS
RESPIRATION RATE: 20 BRPM | OXYGEN SATURATION: 97 % | BODY MASS INDEX: 50.02 KG/M2 | WEIGHT: 293 LBS | SYSTOLIC BLOOD PRESSURE: 136 MMHG | HEIGHT: 64 IN | TEMPERATURE: 99 F | DIASTOLIC BLOOD PRESSURE: 70 MMHG | HEART RATE: 89 BPM

## 2023-08-07 DIAGNOSIS — E03.9 ACQUIRED HYPOTHYROIDISM: ICD-10-CM

## 2023-08-07 DIAGNOSIS — E55.9 VITAMIN D DEFICIENCY, UNSPECIFIED: ICD-10-CM

## 2023-08-07 DIAGNOSIS — G47.33 OBSTRUCTIVE SLEEP APNEA (ADULT) (PEDIATRIC): ICD-10-CM

## 2023-08-07 DIAGNOSIS — E66.01 MORBID OBESITY (HCC): Primary | ICD-10-CM

## 2023-08-07 DIAGNOSIS — I10 ESSENTIAL (PRIMARY) HYPERTENSION: ICD-10-CM

## 2023-08-07 DIAGNOSIS — Z13.220 SCREENING FOR HYPERLIPIDEMIA: ICD-10-CM

## 2023-08-07 DIAGNOSIS — Z13.1 SCREENING FOR DIABETES MELLITUS (DM): ICD-10-CM

## 2023-08-07 DIAGNOSIS — E66.01 MORBID OBESITY (HCC): ICD-10-CM

## 2023-08-07 PROCEDURE — 99214 OFFICE O/P EST MOD 30 MIN: CPT | Performed by: FAMILY MEDICINE

## 2023-08-07 PROCEDURE — 3074F SYST BP LT 130 MM HG: CPT | Performed by: FAMILY MEDICINE

## 2023-08-07 PROCEDURE — 3078F DIAST BP <80 MM HG: CPT | Performed by: FAMILY MEDICINE

## 2023-08-07 RX ORDER — MULTIVITAMIN WITH IRON
250 TABLET ORAL DAILY
COMMUNITY
Start: 2010-02-11

## 2023-08-07 RX ORDER — TEZEPELUMAB-EKKO 210 MG/1.9ML
210 INJECTION, SOLUTION SUBCUTANEOUS ONCE
COMMUNITY

## 2023-08-07 RX ORDER — PREDNISONE 10 MG/1
10 TABLET ORAL DAILY
COMMUNITY

## 2023-08-07 RX ORDER — ASCORBIC ACID 500 MG
500 TABLET ORAL DAILY
COMMUNITY

## 2023-08-07 RX ORDER — ALBUTEROL SULFATE 90 UG/1
2 AEROSOL, METERED RESPIRATORY (INHALATION) EVERY 6 HOURS PRN
COMMUNITY

## 2023-08-08 LAB
25(OH)D3+25(OH)D2 SERPL-MCNC: 46.7 NG/ML (ref 30–100)
ALBUMIN SERPL-MCNC: 4 G/DL (ref 3.8–4.9)
ALP SERPL-CCNC: 149 IU/L (ref 44–121)
ALT SERPL-CCNC: 24 IU/L (ref 0–32)
AST SERPL-CCNC: 16 IU/L (ref 0–40)
BILIRUB DIRECT SERPL-MCNC: 0.12 MG/DL (ref 0–0.4)
BILIRUB SERPL-MCNC: 0.3 MG/DL (ref 0–1.2)
CHOLEST SERPL-MCNC: 165 MG/DL (ref 100–199)
HBA1C MFR BLD: 6.7 % (ref 4.8–5.6)
HDLC SERPL-MCNC: 80 MG/DL
INSULIN SERPL-ACNC: 27.3 UIU/ML (ref 2.6–24.9)
LDLC SERPL CALC-MCNC: 74 MG/DL (ref 0–99)
PROT SERPL-MCNC: 6.6 G/DL (ref 6–8.5)
TRIGL SERPL-MCNC: 54 MG/DL (ref 0–149)
VLDLC SERPL CALC-MCNC: 11 MG/DL (ref 5–40)

## 2023-10-18 ENCOUNTER — OFFICE VISIT (OUTPATIENT)
Age: 53
End: 2023-10-18

## 2023-10-18 DIAGNOSIS — E66.01 MORBID OBESITY DUE TO EXCESS CALORIES (HCC): Primary | ICD-10-CM

## 2023-10-20 NOTE — PROGRESS NOTES
[facility]  Metabolic Weight Loss Program        Patient's Name: Catrina Kunz  : 1970    This patient is a participant at [address] [place] [address] [facility] Management Center and attended the weekly virtual nutrition class hosted via [email or web address].       Jessica Mueller, MS, RD, LDN

## 2023-11-08 ENCOUNTER — OFFICE VISIT (OUTPATIENT)
Age: 53
End: 2023-11-08

## 2023-11-08 DIAGNOSIS — E66.01 MORBID OBESITY DUE TO EXCESS CALORIES (HCC): Primary | ICD-10-CM

## 2023-11-29 ENCOUNTER — OFFICE VISIT (OUTPATIENT)
Age: 53
End: 2023-11-29

## 2023-11-29 DIAGNOSIS — E66.01 MORBID OBESITY DUE TO EXCESS CALORIES (HCC): Primary | ICD-10-CM

## 2024-01-02 ENCOUNTER — OFFICE VISIT (OUTPATIENT)
Age: 54
End: 2024-01-02
Payer: COMMERCIAL

## 2024-01-02 VITALS
HEART RATE: 102 BPM | RESPIRATION RATE: 20 BRPM | TEMPERATURE: 98.6 F | SYSTOLIC BLOOD PRESSURE: 128 MMHG | BODY MASS INDEX: 50.02 KG/M2 | WEIGHT: 293 LBS | OXYGEN SATURATION: 94 % | DIASTOLIC BLOOD PRESSURE: 85 MMHG | HEIGHT: 64 IN

## 2024-01-02 DIAGNOSIS — G47.33 OSA ON CPAP: ICD-10-CM

## 2024-01-02 DIAGNOSIS — E55.9 VITAMIN D DEFICIENCY: ICD-10-CM

## 2024-01-02 DIAGNOSIS — R25.2 MUSCLE CRAMPS: ICD-10-CM

## 2024-01-02 DIAGNOSIS — E11.9 TYPE 2 DIABETES MELLITUS WITHOUT COMPLICATION, WITHOUT LONG-TERM CURRENT USE OF INSULIN (HCC): ICD-10-CM

## 2024-01-02 DIAGNOSIS — I10 ESSENTIAL HYPERTENSION: ICD-10-CM

## 2024-01-02 DIAGNOSIS — I89.0 ACQUIRED LYMPHEDEMA OF LOWER EXTREMITY: ICD-10-CM

## 2024-01-02 DIAGNOSIS — E66.01 MORBID OBESITY DUE TO EXCESS CALORIES (HCC): ICD-10-CM

## 2024-01-02 DIAGNOSIS — E66.01 MORBID OBESITY DUE TO EXCESS CALORIES (HCC): Primary | ICD-10-CM

## 2024-01-02 PROCEDURE — 3074F SYST BP LT 130 MM HG: CPT | Performed by: FAMILY MEDICINE

## 2024-01-02 PROCEDURE — 3079F DIAST BP 80-89 MM HG: CPT | Performed by: FAMILY MEDICINE

## 2024-01-02 PROCEDURE — 3044F HG A1C LEVEL LT 7.0%: CPT | Performed by: FAMILY MEDICINE

## 2024-01-02 PROCEDURE — 99214 OFFICE O/P EST MOD 30 MIN: CPT | Performed by: FAMILY MEDICINE

## 2024-01-02 RX ORDER — SEMAGLUTIDE 0.68 MG/ML
0.5 INJECTION, SOLUTION SUBCUTANEOUS
COMMUNITY
Start: 2023-11-09

## 2024-01-02 RX ORDER — POTASSIUM CHLORIDE 750 MG/1
20 TABLET, FILM COATED, EXTENDED RELEASE ORAL 4 TIMES DAILY
COMMUNITY

## 2024-01-02 RX ORDER — HYDROCHLOROTHIAZIDE 25 MG/1
25 TABLET ORAL DAILY
COMMUNITY

## 2024-01-02 ASSESSMENT — PATIENT HEALTH QUESTIONNAIRE - PHQ9
SUM OF ALL RESPONSES TO PHQ QUESTIONS 1-9: 0
2. FEELING DOWN, DEPRESSED OR HOPELESS: 0
SUM OF ALL RESPONSES TO PHQ QUESTIONS 1-9: 0
SUM OF ALL RESPONSES TO PHQ QUESTIONS 1-9: 0
SUM OF ALL RESPONSES TO PHQ9 QUESTIONS 1 & 2: 0
1. LITTLE INTEREST OR PLEASURE IN DOING THINGS: 0
SUM OF ALL RESPONSES TO PHQ QUESTIONS 1-9: 0

## 2024-01-02 NOTE — PATIENT INSTRUCTIONS
Make appt to sleep medicine  Walk 10 min 3 times a day   Ask ortho for referral to physical therapy for the back

## 2024-01-02 NOTE — PROGRESS NOTES
[facility] Progress Note:   Re-enroll Initial Physician Visit    Nataliya Camacho is a 53 y.o. female who is here for medical screening for re-enrollment into the [facility] Weight [facility] Program.    Encounter Diagnoses   Name Primary?    Morbid obesity due to excess calories (HCC) Yes    Essential hypertension     ADARSH on CPAP     Acquired lymphedema of lower extremity        Weight History  Current weight (!) 140.3 kg (309 lb 4.8 oz)  Body mass index is 53.09 kg/m².  Weight when patient stopped the program 305  now 309 lb  Goal weight BMI 29  Had gotten up to 320s since August 20323  Started the ozempic in nov        Weight loss History  See previous intake note    Significant Medical History  Have you ever taken appetite suppressants? Taking ozempic for blood sugar but this is also an appetite suppressant, now at 0.5 mg a week   If yes: Rx or OTC?  RX   If yes; Any negative side effects?no  Ever diagnosed with sleep apnea or put on CPAP? yes  Ever had bariatric surgery: no  MI w/ in the last 3 months: no  Type I Diabetes: no  Type II Diabetes: yes  Liver or Kidney disease requiring protein restriction: no  Pregnant or planning on becoming pregnant w/in 6 months: no  Recent treatment for Cancer: no  History of Uric-acid Kidney Stone or elevated Uric Acid: no  Peptic ulcer disease not well controlled: no  Recent onset of Inflammatory Bowel Disease: no          Significant Psychosocial History (before starting or since stopping the program)  Any history of drug abuse or dependence: no  Major lifestyle changes: no  Other significant commitments: no  Any potential unsupportive people: no  What lead to you stopping the program? Timing of work schedule  Why are you starting back on the program now?  Just wants to get back on track for her health  Are you ready?  yes    History of binge eating disorder or anorexia : no       Social History  Social History     Tobacco Use    Smoking status: Never

## 2024-01-02 NOTE — PROGRESS NOTES
Identified pt with two pt identifiers (name and ). Reviewed chart in preparation for visit and have obtained necessary documentation.    Nataliya Camacho is a 53 y.o. female  Chief Complaint   Patient presents with    Weight Management     Restart program     /85 (Site: Right Upper Arm, Position: Sitting, Cuff Size: Large Adult)   Pulse (!) 102   Temp 98.6 °F (37 °C) (Oral)   Resp 20   Ht 1.626 m (5' 4\")   Wt (!) 140.3 kg (309 lb 4.8 oz)   SpO2 94%   BMI 53.09 kg/m²     1. Have you been to the ER, urgent care clinic since your last visit?  Hospitalized since your last visit?no    2. Have you seen or consulted any other health care providers outside of the [facility] since your last visit?  Include any pap smears or colon screening. No    BMI - 53.1

## 2024-01-03 LAB
25(OH)D3+25(OH)D2 SERPL-MCNC: 63.9 NG/ML (ref 30–100)
ALBUMIN SERPL-MCNC: 4.2 G/DL (ref 3.8–4.9)
ALBUMIN/GLOB SERPL: 1.6 {RATIO} (ref 1.2–2.2)
ALP SERPL-CCNC: 81 IU/L (ref 44–121)
ALT SERPL-CCNC: 26 IU/L (ref 0–32)
AST SERPL-CCNC: 24 IU/L (ref 0–40)
BILIRUB SERPL-MCNC: 0.3 MG/DL (ref 0–1.2)
BUN SERPL-MCNC: 14 MG/DL (ref 6–24)
BUN/CREAT SERPL: 16 (ref 9–23)
CALCIUM SERPL-MCNC: 9.5 MG/DL (ref 8.7–10.2)
CHLORIDE SERPL-SCNC: 102 MMOL/L (ref 96–106)
CO2 SERPL-SCNC: 26 MMOL/L (ref 20–29)
CREAT SERPL-MCNC: 0.86 MG/DL (ref 0.57–1)
EGFRCR SERPLBLD CKD-EPI 2021: 80 ML/MIN/1.73
GLOBULIN SER CALC-MCNC: 2.7 G/DL (ref 1.5–4.5)
GLUCOSE SERPL-MCNC: 87 MG/DL (ref 70–99)
HBA1C MFR BLD: 6.3 % (ref 4.8–5.6)
MAGNESIUM SERPL-MCNC: 2.2 MG/DL (ref 1.6–2.3)
POTASSIUM SERPL-SCNC: 3.5 MMOL/L (ref 3.5–5.2)
PROT SERPL-MCNC: 6.9 G/DL (ref 6–8.5)
SODIUM SERPL-SCNC: 141 MMOL/L (ref 134–144)

## 2024-01-25 ENCOUNTER — OFFICE VISIT (OUTPATIENT)
Age: 54
End: 2024-01-25

## 2024-01-25 DIAGNOSIS — E66.01 MORBID OBESITY DUE TO EXCESS CALORIES (HCC): Primary | ICD-10-CM

## 2024-01-26 NOTE — PROGRESS NOTES
[facility]  Metabolic Weight Loss Program        Patient's Name: Nataliya Camacho  : 1970    This patient is a participant at [facility] [place] and attended the weekly virtual nutrition class hosted via [employer].      Dania Rhoades, MS, RD, LDN

## 2024-01-29 ENCOUNTER — TELEPHONE (OUTPATIENT)
Age: 54
End: 2024-01-29

## 2024-01-29 NOTE — TELEPHONE ENCOUNTER
Contacted patient regarding referral from Dr. Morgan to Dr. Richardson for SWL. Seminar link sent via e-mail.

## 2024-02-07 NOTE — PROGRESS NOTES
Nurse note from patient's weekly/ LCD  class was reviewed. Pertinent medical concerns were:   reviewed     BP Readings from Last 3 Encounters:   09/30/22 108/74   09/12/22 124/85   08/22/22 120/79       Weight Metrics 9/30/2022 9/21/2022 9/12/2022 8/22/2022 8/17/2022 8/8/2022 7/25/2022   Weight 270 lb 275 lb 11.2 oz 281 lb 12.8 oz 288 lb 6.4 oz 291 lb 9.6 oz 295 lb 302 lb 8 oz   Neck Circ (inches) - - - - - - -   Waist Measure Inches - - - - - - -   Body Fat % - - - - - - -   BMI 46.35 kg/m2 47.32 kg/m2 48.37 kg/m2 49.5 kg/m2 50.05 kg/m2 50.64 kg/m2 51.92 kg/m2       Current Outpatient Medications   Medication Sig Dispense Refill    Xolair 150 mg/mL syrg 150 mg by SubCUTAneous route every thirty (30) days. gabapentin (NEURONTIN) 100 mg capsule Take 100 mg by mouth nightly. clindamycin (CLEOCIN T) 1 % external solution APPLY PEA SIZED AMOUNT TOPICALLY TO THE AFFECTED AREA TWICE DAILY AS NEEDED      hydroquinone (ESOTERICA, MELQUIN) 4 % topical cream       lidocain-me. salicyl-caps-menth 4.0-45-1.070-3 % ptmd by Apply Externally route. EPINEPHrine (EPIPEN) 0.3 mg/0.3 mL injection 0.3 mg by IntraMUSCular route once as needed for Allergic Response. KRILL OIL PO Take 1 Capsule by mouth daily. Raffaele Red brand      allergy injection by SubCUTAneous route every seven (7) days. calcium citrate/vitamin D3 (CITRACAL-D3 PETITES PO) Take 2 Caplet by mouth two (2) times a day. magnesium oxide (MAG-OX) 400 mg tablet Take 400 mg by mouth three (3) times daily. Breo Ellipta 200-25 mcg/dose inhaler INHALE 1 PUFF BY MOUTH EVERY DAY      hydroCHLOROthiazide (HYDRODIURIL) 25 mg tablet Take 25 mg by mouth in the morning. losartan (COZAAR) 100 mg tablet Take 100 mg by mouth in the morning. vitamin K2 100 mcg cap Take 1 Capsule by mouth in the morning. mv-min-folic acid-lutein (Centrum Silver) 400-250 mcg chew Take 1 Tablet by mouth every other day.       multivit,iron,minerals/lutein (CENTRUM SILVER ULTRA WOMEN'S PO) Take 1 Tablet by mouth every other day. Every other day \"mini\"      cholecalciferol, VITAMIN D3, (VITAMIN D3) 5,000 unit tab tablet Take 5,000 Units by mouth in the morning. amLODIPine (NORVASC) 5 mg tablet Take 5 mg by mouth in the morning. TURMERIC, BULK, Take 1 Tablet by mouth three (3) times daily. Patient states 1 capsule of her turmeric is 500 mg      fexofenadine (ALLEGRA) 180 mg tablet Take 180 mg by mouth in the morning. potassium chloride SA (MICRO-K) 10 mEq capsule Take 20 mEq by mouth three (3) times daily. norethindrone acetate (AYGESTIN) 5 mg tablet Take 5 mg by mouth in the morning. coenzyme q10-vitamin e 100-100 mg-unit cap Take 200 mg by mouth daily. montelukast (SINGULAIR) 10 mg tablet Take 10 mg by mouth daily. NASONEX 50 mcg/actuation nasal spray 2 Sprays by Both Nostrils route two (2) times a day. albuterol (PROVENTIL HFA, VENTOLIN HFA, PROAIR HFA) 90 mcg/actuation inhaler Take 2 Puffs by inhalation as needed. cyclobenzaprine (FLEXERIL) 10 mg tablet Take 10 mg by mouth nightly. cinnamon bark 500 mg cap Take 1 Tab by mouth two (2) times a day. Patient states the Cinnamon 1000 mg  is combined with Chromium 400 mg      Biotin 2,500 mcg cap Take 5,000 mcg by mouth two (2) times a day. ascorbic acid, vitamin C, (VITAMIN C) 250 mg tablet Take 500 mg by mouth in the morning. 2021

## 2024-02-20 ENCOUNTER — TELEMEDICINE (OUTPATIENT)
Age: 54
End: 2024-02-20
Payer: COMMERCIAL

## 2024-02-20 DIAGNOSIS — E66.01 OBESITY, MORBID, BMI 50 OR HIGHER (HCC): ICD-10-CM

## 2024-02-20 DIAGNOSIS — G47.33 OSA ON CPAP: ICD-10-CM

## 2024-02-20 DIAGNOSIS — I10 ESSENTIAL HYPERTENSION: ICD-10-CM

## 2024-02-20 DIAGNOSIS — I89.0 ACQUIRED LYMPHEDEMA OF LOWER EXTREMITY: ICD-10-CM

## 2024-02-20 DIAGNOSIS — E66.01 MORBID OBESITY DUE TO EXCESS CALORIES (HCC): Primary | ICD-10-CM

## 2024-02-20 PROCEDURE — 99214 OFFICE O/P EST MOD 30 MIN: CPT | Performed by: FAMILY MEDICINE

## 2024-02-20 RX ORDER — EPINEPHRINE 0.3 MG/.3ML
0.3 INJECTION SUBCUTANEOUS ONCE
COMMUNITY
Start: 2024-02-09

## 2024-02-20 RX ORDER — CLINDAMYCIN PHOSPHATE 11.9 MG/ML
SOLUTION TOPICAL AS NEEDED
COMMUNITY
Start: 2024-02-10

## 2024-02-20 RX ORDER — VALACYCLOVIR HYDROCHLORIDE 500 MG/1
500 TABLET, FILM COATED ORAL DAILY
COMMUNITY
Start: 2024-02-03

## 2024-02-20 RX ORDER — HYDROCODONE POLISTIREX AND CHLORPHENIRAMINE POLISTIREX 10; 8 MG/5ML; MG/5ML
5 SUSPENSION, EXTENDED RELEASE ORAL EVERY 12 HOURS PRN
COMMUNITY
Start: 2024-02-16

## 2024-02-20 RX ORDER — SEMAGLUTIDE 1.34 MG/ML
1 INJECTION, SOLUTION SUBCUTANEOUS
COMMUNITY
Start: 2024-02-09

## 2024-02-20 RX ORDER — ACYCLOVIR 50 MG/G
OINTMENT TOPICAL AS NEEDED
COMMUNITY
Start: 2024-02-15

## 2024-02-20 RX ORDER — CLASCOTERONE 1 G/100G
CREAM TOPICAL
COMMUNITY
Start: 2023-11-20

## 2024-02-20 RX ORDER — HYDROQUINONE 40 MG/G
CREAM TOPICAL 2 TIMES DAILY PRN
COMMUNITY
Start: 2024-02-10

## 2024-02-20 ASSESSMENT — PATIENT HEALTH QUESTIONNAIRE - PHQ9
SUM OF ALL RESPONSES TO PHQ QUESTIONS 1-9: 0
1. LITTLE INTEREST OR PLEASURE IN DOING THINGS: 0
SUM OF ALL RESPONSES TO PHQ QUESTIONS 1-9: 0
2. FEELING DOWN, DEPRESSED OR HOPELESS: 0
SUM OF ALL RESPONSES TO PHQ QUESTIONS 1-9: 0
SUM OF ALL RESPONSES TO PHQ9 QUESTIONS 1 & 2: 0
SUM OF ALL RESPONSES TO PHQ QUESTIONS 1-9: 0

## 2024-02-20 NOTE — PROGRESS NOTES
Identified pt with two pt identifiers (name and ). Reviewed chart in preparation for visit and have obtained necessary documentation.    Nataliya Camacho is a 53 y.o. female  Chief Complaint   Patient presents with    Weight Management     1 month follow up     There were no vitals taken for this visit.    1. Have you been to the ER, urgent care clinic since your last visit?  Hospitalized since your last visit?yes - [facility] for [facility] & COVID-19    2. Have you seen or consulted any other health care providers outside of the [facility] since your last visit?  Include any pap smears or colon screening. yes - PCP & Pulmonologist

## 2024-02-20 NOTE — PROGRESS NOTES
[facility] Progress Note:   F/up Physician Visit    Nataliya Camacho is a 53 y.o. female who was seen by synchronous (real-time) audio-video technology on 2/20/2024.      Consent:  She and/or her healthcare decision maker is aware that this patient-initiated Telehealth encounter is a billable service, with coverage as determined by her insurance carrier. She is aware that she may receive a bill and has provided verbal consent to proceed: Yes    I was at home while conducting this encounter.      712  Subjective:   Nataliya Camacho was seen for Weight Management (1 month follow up)    f/up physician visit for the  LCD Program.    Jan 309  Now: 299.2    She takes ozempic for diabetes,  Just started 1 mg dose 2 weeks ago      Prior to Admission medications    Medication Sig Start Date End Date Taking? Authorizing Provider   acyclovir (ZOVIRAX) 5 % ointment Apply topically as needed 2/15/24  Yes ProviderBrittney MD   clindamycin (CLEOCIN T) 1 % external solution Apply topically as needed 2/10/24  Yes Provider, MD Brittney   Clascoterone (WINLEVI) 1 % CREA Apply topically 11/20/23  Yes Provider, MD Brittney   EPINEPHrine (EPIPEN) 0.3 MG/0.3ML SOAJ injection Inject 0.3 mLs into the muscle once 2/9/24  Yes ProviderBrittney MD   HYDROcodone-chlorpheniramine (TUSSIONEX) 10-8 MG/5ML SUER Take 5 mLs by mouth every 12 hours as needed. 2/16/24  Yes ProviderBrittney MD   hydroquinone 4 % cream Apply topically 2 times daily as needed 2/10/24  Yes Provider, MD Brittney   ipratropium (ATROVENT) 0.02 % nebulizer solution Take by nebulization as needed 2/16/24  Yes ProviderBrittney MD   OZEMPIC, 1 MG/DOSE, 4 MG/3ML SOPN 1 mg every 7 days 2/9/24  Yes ProviderBrittney MD   valACYclovir (VALTREX) 500 MG tablet Take 1 tablet by mouth daily 2/3/24  Yes Brittney Reilly MD   ipratropium (ATROVENT HFA) 17 MCG/ACT inhaler Inhale 2 puffs into the lungs as needed for Wheezing   Yes

## 2024-02-21 ENCOUNTER — OFFICE VISIT (OUTPATIENT)
Age: 54
End: 2024-02-21

## 2024-02-21 DIAGNOSIS — E66.01 MORBID OBESITY DUE TO EXCESS CALORIES (HCC): Primary | ICD-10-CM

## 2024-02-23 NOTE — PROGRESS NOTES
[facility]  Metabolic Weight Loss Program        Patient's Name: Nataliya Camacho  : 1970    This patient is a participant at [facility] [place] and attended the weekly virtual nutrition class hosted via [employer].      Dania Rhoades, MS, RD, LDN   Patient is a 32y old  Male who presents with a chief complaint of nosebleed and fever (22 Dec 2021 14:22)    f/u neutropenic fever    Interval History/ROS:  had test dose ATG, so far okay.  no fever. no rash. breathing okay.  still with R posterior lower gum pain. no abdominal pain.  no dysuria.  Remainder of ROS otherwise negative.    PAST MEDICAL & SURGICAL HISTORY:  Aplastic Anemia  Asthma, stable  Redundant prepuce and phimosis  H/O circumcision    Allergies  No Known Allergies    ANTIMICROBIALS:  acyclovir   Oral Tab/Cap 400 every 8 hours  cefepime   IVPB 2000 every 8 hours (-)  fluconAZOLE   Tablet 200 daily    MEDICATIONS  (STANDING):  cycloSPORINE  (SandIMMUNE) 300 every 12 hours  famotidine    Tablet 20 two times a day  methylPREDNISolone sodium succinate IVPB 500 once    Vital Signs Last 24 Hrs  T(F): 98.1 (21 @ 09:32), Max: 98.5 (21 @ 04:33)  HR: 86 (21 @ 09:32)  BP: 135/82 (21 @ 09:32)  RR: 18 (21 @ 09:32)  SpO2: 99% (21 @ 09:32) (96% - 100%)    PHYSICAL EXAM:  Constitutional: non-toxic  HEAD/EYES: anicteric  ENT:  supple, R lower posterior gum ulcer  Cardiovascular:   normal S1, S2  Respiratory:  clear BS bilaterally  GI:  soft, non-tender, normal bowel sounds  :  no richter  Musculoskeletal:  no synovitis,  Neurologic: awake and alert, normal strength, no focal findings  Skin:  several small ecchymoses, L forearm, early phlebitis?  Psychiatric:  awake, alert, appropriate mood                                           7.7    1.62  )-----------( 8        ( 28 Dec 2021 09:47 )             21.2     138  |  104  |  25  ----------------------------<  117  4.2   |  21  |  0.96  Ca    9.3      28 Dec 2021 09:47Phos  3.1     Mg     2.0       TPro  7.6  /  Alb  4.5  /  TBili  0.5  /  DBili  x   /  AST  15  /  ALT  35  /  AlkPhos  77      Auto Neutrophil #: 0.09 K/uL (21 @ 07:15)  Auto Neutrophil #: 0.13 K/uL (21 @ 07:42)  Auto Neutrophil #: 0.10 K/uL (21 @ 07:14)    Urinalysis Basic - ( 21 Dec 2021 20:53 )  Color: Yellow / Appearance: Clear / S.034 / pH: x  Gluc: x / Ketone: Negative  / Bili: Negative / Urobili: Negative   Blood: x / Protein: 30 mg/dL / Nitrite: Negative   Leuk Esterase: Negative / RBC: 2 /hpf / WBC 1 /HPF   Sq Epi: x / Non Sq Epi: 0 /hpf / Bacteria: Negative    MICROBIOLOGY:  Culture - Group A Streptococcus (collected 21 @ 17:32)  Source: .Throat Throat  Final Report (21 @ 08:04):    No Streptococcus pyogenes (Group A) isolated    Culture - Group A Streptococcus (collected 21 @ 14:58)  Source: .Throat Throat  Final Report (21 @ 08:00):    No Streptococcus pyogenes (Group A) isolated    Culture - Blood (collected 21 @ 09:16)  Source: .Blood Blood-Venous  Preliminary Report (21 @ 10:01):    No growth to date.    Culture - Blood (collected 21 @ 09:16)  Source: .Blood Blood-Peripheral  Preliminary Report (21 @ 10:01):    No growth to date.    Culture - Urine (collected 21 @ 01:14)  Source: Clean Catch Clean Catch (Midstream)  Final Report (21 @ 21:12):    No growth    Culture - Blood (collected 21 @ 17:15)  Source: .Blood Blood  Final Report (21 @ 18:00):    No Growth Final    Culture - Blood (collected 21 @ 17:15)  Source: .Blood Blood  Final Report (21 @ 18:00):    No Growth Final    Rapid RVP Result: NotDetec ( @ 13:37)    RADIOLOGY:  imaging below personally reviewed and agree with findings    CT Neck Soft Tissue w/wo IV Cont (21 @ 12:00) >  IMPRESSION: Moderate mucosal thickening within the LEFT maxillary, BILATERAL ethmoid and frontal sinuses. Minimal increased density seen within the BILATERAL frontal sinuses which may reflect inspissated secretions or possibly fungal material, less likely hemorrhage.   Minimal reactive lymph nodes noted..    CT Abdomen and Pelvis w/ IV Cont (21 @ 20:04) >  IMPRESSION: No CT evidence for source of infection in the chest, abdomen or pelvis.     Xray Chest 1 View- PORTABLE-Urgent (Xray Chest 1 View- PORTABLE-Urgent .) (21 @ 14:08) >  IMPRESSION:  No active pulmonary disease.

## 2024-03-01 ENCOUNTER — TELEPHONE (OUTPATIENT)
Age: 54
End: 2024-03-01

## 2024-03-13 ENCOUNTER — OFFICE VISIT (OUTPATIENT)
Age: 54
End: 2024-03-13

## 2024-03-13 DIAGNOSIS — E66.01 MORBID OBESITY DUE TO EXCESS CALORIES (HCC): Primary | ICD-10-CM

## 2024-03-14 NOTE — PROGRESS NOTES
[facility]  Metabolic Weight Loss Program        Patient's Name: Nataliya Camacho  : 1970    This patient is a participant at [facility] [place] and attended the weekly virtual nutrition class hosted via [employer].      Dania Rhoades, MS, RD, LDN

## 2024-03-20 ENCOUNTER — OFFICE VISIT (OUTPATIENT)
Age: 54
End: 2024-03-20

## 2024-03-20 DIAGNOSIS — E66.01 MORBID OBESITY DUE TO EXCESS CALORIES (HCC): Primary | ICD-10-CM

## 2024-03-22 ENCOUNTER — TELEMEDICINE (OUTPATIENT)
Age: 54
End: 2024-03-22
Payer: COMMERCIAL

## 2024-03-22 VITALS — WEIGHT: 293 LBS | BODY MASS INDEX: 50.02 KG/M2 | HEIGHT: 64 IN

## 2024-03-22 DIAGNOSIS — E11.9 TYPE 2 DIABETES MELLITUS WITHOUT COMPLICATION, WITHOUT LONG-TERM CURRENT USE OF INSULIN (HCC): ICD-10-CM

## 2024-03-22 DIAGNOSIS — I10 ESSENTIAL HYPERTENSION: ICD-10-CM

## 2024-03-22 DIAGNOSIS — E87.6 HYPOKALEMIA: ICD-10-CM

## 2024-03-22 DIAGNOSIS — E66.01 OBESITY, MORBID, BMI 50 OR HIGHER (HCC): ICD-10-CM

## 2024-03-22 DIAGNOSIS — I89.0 ACQUIRED LYMPHEDEMA OF LOWER EXTREMITY: ICD-10-CM

## 2024-03-22 DIAGNOSIS — E66.01 MORBID OBESITY DUE TO EXCESS CALORIES (HCC): Primary | ICD-10-CM

## 2024-03-22 DIAGNOSIS — G47.33 OSA ON CPAP: ICD-10-CM

## 2024-03-22 PROCEDURE — 99214 OFFICE O/P EST MOD 30 MIN: CPT | Performed by: FAMILY MEDICINE

## 2024-03-22 PROCEDURE — 3044F HG A1C LEVEL LT 7.0%: CPT | Performed by: FAMILY MEDICINE

## 2024-03-22 RX ORDER — DOXYCYCLINE HYCLATE 100 MG/1
100 CAPSULE ORAL DAILY
COMMUNITY
Start: 2024-03-21

## 2024-03-22 RX ORDER — TOPIRAMATE 25 MG/1
25 TABLET ORAL DAILY
Qty: 60 TABLET | Refills: 2 | Status: SHIPPED | OUTPATIENT
Start: 2024-03-22

## 2024-03-22 ASSESSMENT — PATIENT HEALTH QUESTIONNAIRE - PHQ9
2. FEELING DOWN, DEPRESSED OR HOPELESS: NOT AT ALL
SUM OF ALL RESPONSES TO PHQ QUESTIONS 1-9: 0
1. LITTLE INTEREST OR PLEASURE IN DOING THINGS: NOT AT ALL
SUM OF ALL RESPONSES TO PHQ9 QUESTIONS 1 & 2: 0
SUM OF ALL RESPONSES TO PHQ QUESTIONS 1-9: 0

## 2024-03-22 NOTE — PROGRESS NOTES
Identified pt with two pt identifiers (name and ). Reviewed chart in preparation for visit and have obtained necessary documentation.    Nataliya Camacho is a 53 y.o. female  Chief Complaint   Patient presents with    Weight Management     1 month LCD     Ht 1.626 m (5' 4\")   Wt (!) 137.4 kg (303 lb)   BMI 52.01 kg/m²     1. Have you been to the ER, urgent care clinic since your last visit?  Hospitalized since your last visit?no    2. Have you seen or consulted any other health care providers outside of the [facility] since your last visit?  Include any pap smears or colon screening. no   
MG tablet Take 1 tablet by mouth daily 2/15/23  Yes Provider, Historical, MD   gabapentin (NEURONTIN) 300 MG capsule Take 3 capsules by mouth nightly. 12/14/22  Yes Provider, Historical, MD   BREGARRY ELLIPTA 200-25 MCG/ACT AEPB inhaler Inhale 1 puff into the lungs daily 2/12/23  Yes Provider, Historical, MD   cyclobenzaprine (FLEXERIL) 10 MG tablet Take 1 tablet by mouth nightly 2/16/23  Yes Provider, Historical, MD   amLODIPine (NORVASC) 5 MG tablet TAKE 1 TABLET BY MOUTH DAILY 11/29/22  Yes Provider, Historical, MD     Allergies   Allergen Reactions    Amoxicillin Shortness Of Breath    Triamcinolone Acetonide Swelling     cortisone flare  Pt states she gets \"cortisone flare\" injection    Kenalog Injection      Metformin Nausea And Vomiting and Other (See Comments)     Other reaction(s): Other (see comments)  lethargic  lethargic  lethargic  lethargic             Review of Systems        CC: Weight Management      Nataliya Camacho is a 53 y.o. female who is here for her          3/22/2024    10:15 AM 1/2/2024     1:10 PM 1/2/2024     1:00 PM 8/7/2023    10:50 AM 8/7/2023    10:00 AM 5/9/2023     8:49 AM 1/17/2023     9:56 AM   Weight Metrics   Weight 303 lb 309 lb 4.8 oz  305 lb 9.6 oz  289 lb 261 lb   Neck (Inches)   15.75 in  15.5 in     Waist Measure Inches   54.75 in  52.5 in     Body Fat %   50.7 %  50.4 %     BMI (Calculated) 52.1 kg/m2 53.2 kg/m2  52.6 kg/m2  49.7 kg/m2 44.9 kg/m2          No data to display                   Current Outpatient Medications   Medication Sig Dispense Refill    doxycycline hyclate (VIBRAMYCIN) 100 MG capsule Take 1 capsule by mouth daily FOR 60 DAY      Tazarotene 0.045 % LOTN Apply topically at bedtime      topiramate (TOPAMAX) 25 MG tablet Take 1 tablet by mouth daily 60 tablet 2    acyclovir (ZOVIRAX) 5 % ointment Apply topically as needed      clindamycin (CLEOCIN T) 1 % external solution Apply topically as needed      Clascoterone (WINLEVI) 1 % CREA Apply topically

## 2024-03-22 NOTE — PROGRESS NOTES
[facility]  Metabolic Weight Loss Program        Patient's Name: Nataliya Camacho  : 1970    This patient is a participant at [facility] [place] and attended the weekly virtual nutrition class hosted via [employer].      Dania Rhoades, MS, RD, LDN

## 2024-03-27 ENCOUNTER — OFFICE VISIT (OUTPATIENT)
Age: 54
End: 2024-03-27

## 2024-03-27 DIAGNOSIS — E66.01 MORBID OBESITY DUE TO EXCESS CALORIES (HCC): Primary | ICD-10-CM

## 2024-04-03 ENCOUNTER — OFFICE VISIT (OUTPATIENT)
Age: 54
End: 2024-04-03

## 2024-04-03 DIAGNOSIS — E66.01 MORBID OBESITY DUE TO EXCESS CALORIES (HCC): Primary | ICD-10-CM

## 2024-04-03 NOTE — PROGRESS NOTES
[facility]  Metabolic Weight Loss Program        Patient's Name: Nataliya Camacho  : 1970    This patient is a participant at [facility] [place] and attended the weekly virtual nutrition class hosted via [employer].      Dania Rhoades, MS, RD, LDN   None

## 2024-04-04 LAB
25(OH)D3+25(OH)D2 SERPL-MCNC: 79.3 NG/ML (ref 30–100)
ALBUMIN SERPL-MCNC: 4.1 G/DL (ref 3.8–4.9)
ALP SERPL-CCNC: 102 IU/L (ref 44–121)
ALT SERPL-CCNC: 28 IU/L (ref 0–32)
AST SERPL-CCNC: 24 IU/L (ref 0–40)
BILIRUB DIRECT SERPL-MCNC: 0.12 MG/DL (ref 0–0.4)
BILIRUB SERPL-MCNC: 0.3 MG/DL (ref 0–1.2)
CHOLEST SERPL-MCNC: 162 MG/DL (ref 100–199)
HBA1C MFR BLD: 6 % (ref 4.8–5.6)
HDLC SERPL-MCNC: 49 MG/DL
INSULIN SERPL-ACNC: 70.2 UIU/ML (ref 2.6–24.9)
LDLC SERPL CALC-MCNC: 95 MG/DL (ref 0–99)
PROT SERPL-MCNC: 6.9 G/DL (ref 6–8.5)
TRIGL SERPL-MCNC: 100 MG/DL (ref 0–149)
VLDLC SERPL CALC-MCNC: 18 MG/DL (ref 5–40)

## 2024-04-08 NOTE — PROGRESS NOTES
[facility]  Metabolic Weight Loss Program        Patient's Name: Nataliya Camacho  : 1970    This patient is a participant at [facility] [place] and attended the weekly virtual nutrition class hosted via [employer].      Dania Rhoades, MS, RD, LDN

## 2024-04-09 LAB
ALBUMIN SERPL-MCNC: 3.9 G/DL (ref 3.8–4.9)
ALBUMIN/GLOB SERPL: 1.5 {RATIO} (ref 1.2–2.2)
ALP SERPL-CCNC: 122 IU/L (ref 44–121)
ALT SERPL-CCNC: 26 IU/L (ref 0–32)
AST SERPL-CCNC: 23 IU/L (ref 0–40)
BILIRUB DIRECT SERPL-MCNC: 0.11 MG/DL (ref 0–0.4)
BILIRUB SERPL-MCNC: <0.2 MG/DL (ref 0–1.2)
BUN SERPL-MCNC: 14 MG/DL (ref 6–24)
BUN/CREAT SERPL: 17 (ref 9–23)
CALCIUM SERPL-MCNC: 9.1 MG/DL (ref 8.7–10.2)
CHLORIDE SERPL-SCNC: 101 MMOL/L (ref 96–106)
CO2 SERPL-SCNC: 23 MMOL/L (ref 20–29)
CREAT SERPL-MCNC: 0.81 MG/DL (ref 0.57–1)
EGFRCR SERPLBLD CKD-EPI 2021: 86 ML/MIN/1.73
GLOBULIN SER CALC-MCNC: 2.6 G/DL (ref 1.5–4.5)
GLUCOSE SERPL-MCNC: 88 MG/DL (ref 70–99)
INSULIN SERPL-ACNC: 151 UIU/ML (ref 2.6–24.9)
POTASSIUM SERPL-SCNC: 3.5 MMOL/L (ref 3.5–5.2)
PROT SERPL-MCNC: 6.5 G/DL (ref 6–8.5)
SODIUM SERPL-SCNC: 140 MMOL/L (ref 134–144)

## 2024-04-17 ENCOUNTER — OFFICE VISIT (OUTPATIENT)
Age: 54
End: 2024-04-17

## 2024-04-17 DIAGNOSIS — E66.01 MORBID OBESITY DUE TO EXCESS CALORIES (HCC): Primary | ICD-10-CM

## 2024-04-17 LAB
25(OH)D3+25(OH)D2 SERPL-MCNC: 72.8 NG/ML (ref 30–100)
ALBUMIN SERPL-MCNC: 4 G/DL (ref 3.8–4.9)
ALBUMIN/GLOB SERPL: 1.5 {RATIO} (ref 1.2–2.2)
ALP SERPL-CCNC: 94 IU/L (ref 44–121)
ALT SERPL-CCNC: 25 IU/L (ref 0–32)
AST SERPL-CCNC: 23 IU/L (ref 0–40)
BILIRUB SERPL-MCNC: 0.4 MG/DL (ref 0–1.2)
BUN SERPL-MCNC: 11 MG/DL (ref 6–24)
BUN/CREAT SERPL: 14 (ref 9–23)
CALCIUM SERPL-MCNC: 8.9 MG/DL (ref 8.7–10.2)
CHLORIDE SERPL-SCNC: 100 MMOL/L (ref 96–106)
CO2 SERPL-SCNC: 24 MMOL/L (ref 20–29)
CREAT SERPL-MCNC: 0.78 MG/DL (ref 0.57–1)
EGFRCR SERPLBLD CKD-EPI 2021: 90 ML/MIN/1.73
GLOBULIN SER CALC-MCNC: 2.6 G/DL (ref 1.5–4.5)
GLUCOSE SERPL-MCNC: 83 MG/DL (ref 70–99)
HBA1C MFR BLD: 5.9 % (ref 4.8–5.6)
MAGNESIUM SERPL-MCNC: 2.1 MG/DL (ref 1.6–2.3)
POTASSIUM SERPL-SCNC: 3.3 MMOL/L (ref 3.5–5.2)
PROT SERPL-MCNC: 6.6 G/DL (ref 6–8.5)
SODIUM SERPL-SCNC: 140 MMOL/L (ref 134–144)

## 2024-04-22 NOTE — PROGRESS NOTES
[facility]  Metabolic Weight Loss Program        Patient's Name: Nataliya Camacho  : 1970    This patient is a participant at [facility] [place] and attended the weekly virtual nutrition class hosted via [employer].      Dania Rhoades, MS, RD, LDN

## 2024-04-24 ENCOUNTER — OFFICE VISIT (OUTPATIENT)
Age: 54
End: 2024-04-24

## 2024-04-24 DIAGNOSIS — E66.01 MORBID OBESITY DUE TO EXCESS CALORIES (HCC): Primary | ICD-10-CM

## 2024-05-01 ENCOUNTER — OFFICE VISIT (OUTPATIENT)
Age: 54
End: 2024-05-01

## 2024-05-01 DIAGNOSIS — E66.01 MORBID OBESITY DUE TO EXCESS CALORIES (HCC): Primary | ICD-10-CM

## 2024-05-01 NOTE — PROGRESS NOTES
[facility]  Metabolic Weight Loss Program        Patient's Name: Nataliya Camacho  : 1970    This patient is a participant at [facility] [place] and attended the weekly virtual nutrition class hosted via [employer].      Dania Rhoades, MS, RD, LDN

## 2024-05-06 NOTE — PROGRESS NOTES
[facility]  Metabolic Weight Loss Program        Patient's Name: Nataliya Camacho  : 1970    This patient is a participant at [facility] [place] and attended the weekly virtual nutrition class hosted via [employer].      Dania Rhoades, MS, RD, LDN

## 2024-05-15 ENCOUNTER — OFFICE VISIT (OUTPATIENT)
Age: 54
End: 2024-05-15

## 2024-05-15 DIAGNOSIS — E66.01 MORBID OBESITY DUE TO EXCESS CALORIES (HCC): Primary | ICD-10-CM

## 2024-05-17 NOTE — PROGRESS NOTES
[facility]  Metabolic Weight Loss Program        Patient's Name: Nataliya Camacho  : 1970    This patient is a participant at [facility] [place] and attended the weekly virtual nutrition class hosted via [employer].      Dania Rhoades, MS, RD, LDN

## 2024-05-22 ENCOUNTER — OFFICE VISIT (OUTPATIENT)
Age: 54
End: 2024-05-22

## 2024-05-22 DIAGNOSIS — E66.01 MORBID OBESITY DUE TO EXCESS CALORIES (HCC): Primary | ICD-10-CM

## 2024-05-24 NOTE — PROGRESS NOTES
[facility]  Metabolic Weight Loss Program        Patient's Name: Nataliya Camacho  : 1970    This patient is a participant at [facility] [place] and attended the weekly virtual nutrition class hosted via [employer].      Dania Rhoades, MS, RD, LDN

## 2024-06-06 LAB
ALBUMIN SERPL-MCNC: 3.9 G/DL (ref 3.8–4.9)
ALBUMIN/GLOB SERPL: 1.5 {RATIO} (ref 1.2–2.2)
ALP SERPL-CCNC: 93 IU/L (ref 44–121)
ALT SERPL-CCNC: 23 IU/L (ref 0–32)
AST SERPL-CCNC: 23 IU/L (ref 0–40)
BILIRUB SERPL-MCNC: 0.3 MG/DL (ref 0–1.2)
BUN SERPL-MCNC: 10 MG/DL (ref 6–24)
BUN/CREAT SERPL: 11 (ref 9–23)
CALCIUM SERPL-MCNC: 9 MG/DL (ref 8.7–10.2)
CHLORIDE SERPL-SCNC: 104 MMOL/L (ref 96–106)
CO2 SERPL-SCNC: 26 MMOL/L (ref 20–29)
CREAT SERPL-MCNC: 0.93 MG/DL (ref 0.57–1)
EGFRCR SERPLBLD CKD-EPI 2021: 73 ML/MIN/1.73
GLOBULIN SER CALC-MCNC: 2.6 G/DL (ref 1.5–4.5)
GLUCOSE SERPL-MCNC: 88 MG/DL (ref 70–99)
POTASSIUM SERPL-SCNC: 3.6 MMOL/L (ref 3.5–5.2)
PROT SERPL-MCNC: 6.5 G/DL (ref 6–8.5)
SODIUM SERPL-SCNC: 142 MMOL/L (ref 134–144)

## 2024-07-25 ENCOUNTER — OFFICE VISIT (OUTPATIENT)
Age: 54
End: 2024-07-25
Payer: COMMERCIAL

## 2024-07-25 VITALS
BODY MASS INDEX: 50.02 KG/M2 | HEART RATE: 102 BPM | RESPIRATION RATE: 18 BRPM | TEMPERATURE: 98.2 F | DIASTOLIC BLOOD PRESSURE: 84 MMHG | SYSTOLIC BLOOD PRESSURE: 132 MMHG | WEIGHT: 293 LBS | HEIGHT: 64 IN | OXYGEN SATURATION: 98 %

## 2024-07-25 DIAGNOSIS — E66.01 CLASS 3 SEVERE OBESITY DUE TO EXCESS CALORIES WITH SERIOUS COMORBIDITY AND BODY MASS INDEX (BMI) OF 50.0 TO 59.9 IN ADULT (HCC): Primary | ICD-10-CM

## 2024-07-25 DIAGNOSIS — I10 ESSENTIAL HYPERTENSION: ICD-10-CM

## 2024-07-25 DIAGNOSIS — E11.9 TYPE 2 DIABETES MELLITUS WITHOUT COMPLICATION, WITHOUT LONG-TERM CURRENT USE OF INSULIN (HCC): ICD-10-CM

## 2024-07-25 DIAGNOSIS — G47.33 OSA ON CPAP: ICD-10-CM

## 2024-07-25 PROCEDURE — 3044F HG A1C LEVEL LT 7.0%: CPT | Performed by: FAMILY MEDICINE

## 2024-07-25 PROCEDURE — 99214 OFFICE O/P EST MOD 30 MIN: CPT | Performed by: FAMILY MEDICINE

## 2024-07-25 PROCEDURE — 3079F DIAST BP 80-89 MM HG: CPT | Performed by: FAMILY MEDICINE

## 2024-07-25 PROCEDURE — 3075F SYST BP GE 130 - 139MM HG: CPT | Performed by: FAMILY MEDICINE

## 2024-07-25 NOTE — PROGRESS NOTES
Identified pt with two pt identifiers (name and ). Reviewed chart in preparation for visit and have obtained necessary documentation.    Nataliya Camacho is a 53 y.o. female  Chief Complaint   Patient presents with    Weight Management     Restart program     /84 (Site: Right Upper Arm, Position: Sitting, Cuff Size: Large Adult)   Pulse (!) 102   Temp 98.2 °F (36.8 °C) (Oral)   Resp 18   Ht 1.626 m (5' 4\")   Wt 135.6 kg (299 lb)   SpO2 98%   BMI 51.32 kg/m²     1. Have you been to the ER, urgent care clinic since your last visit?  Hospitalized since your last visit?no    2. Have you seen or consulted any other health care providers outside of the [facility] since your last visit?  Include any pap smears or colon screening. No    BMI - 51.3

## 2024-07-25 NOTE — PROGRESS NOTES
[facility] Program Progress Note:   Re-enroll Initial Physician Visit    Nataliya Camacho is a 53 y.o. female who is here for medical screening for re-enrollment into the [facility] Weight Loss Program.    Encounter Diagnoses   Name Primary?    Class 3 severe obesity due to excess calories with serious comorbidity and body mass index (BMI) of 50.0 to 59.9 in adult (HCC) Yes    Essential hypertension     Type 2 diabetes mellitus without complication, without long-term current use of insulin (HCC)     ADARSH on CPAP        Weight History  Current weight 135.6 kg (299 lb)  Body mass index is 51.32 kg/m².  Weight when patient stopped the program 303 now 299  Goal weight bmi 29    Weight loss History  See previous intake note    Significant Medical History  Have you ever taken appetite suppressants? Yes, taking ozepic 2 mg a week for diabetes, she stopped the topamax   If yes: Rx or OTC?  RX   If yes; Any negative side effects?no  Ever diagnosed with sleep apnea or put on CPAP? yes  Ever had bariatric surgery: no  MI w/ in the last 3 months: no  Type I Diabetes: no  Type II Diabetes: no  Liver or Kidney disease requiring protein restriction: no  Pregnant or planning on becoming pregnant w/in 6 months: no  Recent treatment for Cancer: no  History of Uric-acid Kidney Stone or elevated Uric Acid: no  Peptic ulcer disease not well controlled: no  Recent onset of Inflammatory Bowel Disease: no        Significant Psychosocial History (before starting or since stopping the program)  Any history of drug abuse or dependence: no  Major lifestyle changes: no  Other significant commitments: no  Any potential unsupportive people: no  What lead to you stopping the program? She missed her appt in may and this was the next available  Why are you starting back on the program now?  timing  Are you ready?  yes    History of binge eating disorder or anorexia : no       Social History  Social History     Tobacco Use    Smoking

## 2024-08-29 ENCOUNTER — TELEPHONE (OUTPATIENT)
Age: 54
End: 2024-08-29

## 2024-08-29 NOTE — TELEPHONE ENCOUNTER
Called patient in regards to an appointment reminder for 8/29 with Dr. Morgan. Patient did not answer, left voicemail instructing patient to call back.

## 2024-08-30 ENCOUNTER — TELEMEDICINE (OUTPATIENT)
Age: 54
End: 2024-08-30
Payer: COMMERCIAL

## 2024-08-30 VITALS
HEIGHT: 64 IN | DIASTOLIC BLOOD PRESSURE: 82 MMHG | WEIGHT: 293 LBS | BODY MASS INDEX: 50.02 KG/M2 | SYSTOLIC BLOOD PRESSURE: 126 MMHG

## 2024-08-30 DIAGNOSIS — I10 ESSENTIAL HYPERTENSION: ICD-10-CM

## 2024-08-30 DIAGNOSIS — G47.33 OSA ON CPAP: ICD-10-CM

## 2024-08-30 DIAGNOSIS — E66.01 CLASS 3 SEVERE OBESITY DUE TO EXCESS CALORIES WITH SERIOUS COMORBIDITY AND BODY MASS INDEX (BMI) OF 50.0 TO 59.9 IN ADULT (HCC): Primary | ICD-10-CM

## 2024-08-30 DIAGNOSIS — E11.9 TYPE 2 DIABETES MELLITUS WITHOUT COMPLICATION, WITHOUT LONG-TERM CURRENT USE OF INSULIN (HCC): ICD-10-CM

## 2024-08-30 PROCEDURE — 3079F DIAST BP 80-89 MM HG: CPT | Performed by: FAMILY MEDICINE

## 2024-08-30 PROCEDURE — 99214 OFFICE O/P EST MOD 30 MIN: CPT | Performed by: FAMILY MEDICINE

## 2024-08-30 PROCEDURE — 3074F SYST BP LT 130 MM HG: CPT | Performed by: FAMILY MEDICINE

## 2024-08-30 PROCEDURE — 3044F HG A1C LEVEL LT 7.0%: CPT | Performed by: FAMILY MEDICINE

## 2024-08-30 RX ORDER — VIT C/B6/B5/MAGNESIUM/HERB 173 50-5-6-5MG
500 CAPSULE ORAL DAILY
COMMUNITY

## 2024-08-30 RX ORDER — ATORVASTATIN CALCIUM 20 MG/1
20 TABLET, FILM COATED ORAL NIGHTLY
COMMUNITY
Start: 2024-08-05

## 2024-08-30 ASSESSMENT — PATIENT HEALTH QUESTIONNAIRE - PHQ9
SUM OF ALL RESPONSES TO PHQ QUESTIONS 1-9: 0
2. FEELING DOWN, DEPRESSED OR HOPELESS: NOT AT ALL
SUM OF ALL RESPONSES TO PHQ QUESTIONS 1-9: 0
1. LITTLE INTEREST OR PLEASURE IN DOING THINGS: NOT AT ALL
SUM OF ALL RESPONSES TO PHQ QUESTIONS 1-9: 0
SUM OF ALL RESPONSES TO PHQ QUESTIONS 1-9: 0
SUM OF ALL RESPONSES TO PHQ9 QUESTIONS 1 & 2: 0

## 2024-08-30 NOTE — PROGRESS NOTES
[facility] Progress Note:   F/up Physician Visit    Nataliya Camacho is a 53 y.o. female who was seen by synchronous (real-time) audio-video technology on 8/30/2024.      Consent:  She and/or her healthcare decision maker is aware that this patient-initiated Telehealth encounter is a billable service, with coverage as determined by her insurance carrier. She is aware that she may receive a bill and has provided verbal consent to proceed: Yes    Nataliya Camacho, was evaluated through a synchronous (real-time) audio-video encounter. The patient (or guardian if applicable) is aware that this is a billable service, which includes applicable co-pays. This Virtual Visit was conducted with patient's (and/or legal guardian's) consent. Patient identification was verified, and a caregiver was present when appropriate.   The patient was located at Home: [address] [place] [id number]  Provider was located at Home (Appt Dept State[place]  Confirm you are appropriately licensed, registered, or certified to deliver care in the state where the patient is located as indicated above. If you are not or unsure, please re-schedule the visit: Yes, I confirm.          --Ingrid Morgan MD on 8/30/2024 at 8:46 AM           712  Subjective:   Nataliya Camacho was seen for Weight Management and Medication Management    f/up physician visit for the  LCD Program.  Ordering out lunch \" a lot\"  July 299  Now 301  Prior to Admission medications    Medication Sig Start Date End Date Taking? Authorizing Provider   atorvastatin (LIPITOR) 20 MG tablet Take 1 tablet by mouth nightly 8/5/24  Yes Brittney Reilly MD   turmeric 500 MG CAPS Take 1 capsule by mouth daily   Yes Brittney Reilly MD   Menaquinone-7 (VITAMIN K2 PO) Take 1 capsule by mouth daily   Yes Brittney Reilly MD   Coenzyme Q10 (CO Q 10 PO) Take 1 capsule by mouth daily   Yes Brittney Reilly MD   Tazarotene 0.045 % LOTN Apply topically at

## 2024-08-30 NOTE — PROGRESS NOTES
Identified pt with two pt identifiers (name and ). Reviewed chart in preparation for visit and have obtained necessary documentation.    Nataliya Camacho is a 53 y.o. female  Chief Complaint   Patient presents with    Weight Management    Medication Management     /82   Ht 1.626 m (5' 4\")   Wt (!) 136.7 kg (301 lb 6.4 oz)   BMI 51.74 kg/m²     1. Have you been to the ER, urgent care clinic since your last visit?  Hospitalized since your last visit?no    2. Have you seen or consulted any other health care providers outside of the [facility] since your last visit?  Include any pap smears or colon screening. Yes PCP, Endocrinologist and Podiatrist    Pt reports that she is not taking Topamax.

## 2024-09-04 ENCOUNTER — OFFICE VISIT (OUTPATIENT)
Age: 54
End: 2024-09-04

## 2024-09-04 DIAGNOSIS — E66.01 CLASS 3 SEVERE OBESITY DUE TO EXCESS CALORIES WITH SERIOUS COMORBIDITY AND BODY MASS INDEX (BMI) OF 50.0 TO 59.9 IN ADULT (HCC): Primary | ICD-10-CM

## 2024-09-11 ENCOUNTER — OFFICE VISIT (OUTPATIENT)
Age: 54
End: 2024-09-11

## 2024-09-11 DIAGNOSIS — E66.01 CLASS 3 SEVERE OBESITY DUE TO EXCESS CALORIES WITH SERIOUS COMORBIDITY AND BODY MASS INDEX (BMI) OF 50.0 TO 59.9 IN ADULT (HCC): Primary | ICD-10-CM

## 2024-09-18 ENCOUNTER — OFFICE VISIT (OUTPATIENT)
Age: 54
End: 2024-09-18

## 2024-09-18 DIAGNOSIS — E66.01 CLASS 3 SEVERE OBESITY DUE TO EXCESS CALORIES WITH SERIOUS COMORBIDITY AND BODY MASS INDEX (BMI) OF 50.0 TO 59.9 IN ADULT: Primary | ICD-10-CM

## 2024-09-25 ENCOUNTER — OFFICE VISIT (OUTPATIENT)
Age: 54
End: 2024-09-25

## 2024-09-25 DIAGNOSIS — E66.813 CLASS 3 SEVERE OBESITY DUE TO EXCESS CALORIES WITH SERIOUS COMORBIDITY AND BODY MASS INDEX (BMI) OF 50.0 TO 59.9 IN ADULT: Primary | ICD-10-CM

## 2024-09-25 DIAGNOSIS — E66.01 CLASS 3 SEVERE OBESITY DUE TO EXCESS CALORIES WITH SERIOUS COMORBIDITY AND BODY MASS INDEX (BMI) OF 50.0 TO 59.9 IN ADULT: Primary | ICD-10-CM

## 2024-09-27 ENCOUNTER — TELEMEDICINE (OUTPATIENT)
Age: 54
End: 2024-09-27
Payer: COMMERCIAL

## 2024-09-27 VITALS
SYSTOLIC BLOOD PRESSURE: 145 MMHG | HEART RATE: 95 BPM | BODY MASS INDEX: 50.02 KG/M2 | WEIGHT: 293 LBS | DIASTOLIC BLOOD PRESSURE: 88 MMHG | HEIGHT: 64 IN

## 2024-09-27 DIAGNOSIS — G47.33 OSA ON CPAP: ICD-10-CM

## 2024-09-27 DIAGNOSIS — E66.01 CLASS 3 SEVERE OBESITY DUE TO EXCESS CALORIES WITH SERIOUS COMORBIDITY AND BODY MASS INDEX (BMI) OF 50.0 TO 59.9 IN ADULT: Primary | ICD-10-CM

## 2024-09-27 DIAGNOSIS — E11.9 TYPE 2 DIABETES MELLITUS WITHOUT COMPLICATION, WITHOUT LONG-TERM CURRENT USE OF INSULIN (HCC): ICD-10-CM

## 2024-09-27 DIAGNOSIS — I10 ESSENTIAL HYPERTENSION: ICD-10-CM

## 2024-09-27 PROCEDURE — 3044F HG A1C LEVEL LT 7.0%: CPT | Performed by: FAMILY MEDICINE

## 2024-09-27 PROCEDURE — 99214 OFFICE O/P EST MOD 30 MIN: CPT | Performed by: FAMILY MEDICINE

## 2024-09-27 PROCEDURE — 3079F DIAST BP 80-89 MM HG: CPT | Performed by: FAMILY MEDICINE

## 2024-09-27 PROCEDURE — 3077F SYST BP >= 140 MM HG: CPT | Performed by: FAMILY MEDICINE

## 2024-09-27 RX ORDER — SEMAGLUTIDE 2.68 MG/ML
2 INJECTION, SOLUTION SUBCUTANEOUS
COMMUNITY
Start: 2024-09-18

## 2024-09-27 RX ORDER — MUPIROCIN 20 MG/G
OINTMENT TOPICAL AS NEEDED
COMMUNITY
Start: 2024-09-04

## 2024-09-27 RX ORDER — BLOOD SUGAR DIAGNOSTIC
1 STRIP MISCELLANEOUS DAILY
COMMUNITY
Start: 2024-09-03

## 2024-09-27 ASSESSMENT — PATIENT HEALTH QUESTIONNAIRE - PHQ9
2. FEELING DOWN, DEPRESSED OR HOPELESS: NOT AT ALL
SUM OF ALL RESPONSES TO PHQ QUESTIONS 1-9: 0
SUM OF ALL RESPONSES TO PHQ QUESTIONS 1-9: 0
1. LITTLE INTEREST OR PLEASURE IN DOING THINGS: NOT AT ALL
SUM OF ALL RESPONSES TO PHQ9 QUESTIONS 1 & 2: 0
SUM OF ALL RESPONSES TO PHQ QUESTIONS 1-9: 0
SUM OF ALL RESPONSES TO PHQ QUESTIONS 1-9: 0

## 2024-10-01 NOTE — PROGRESS NOTES
[facility]  Metabolic Weight Loss Program        Patient's Name: Nataliya Camacho  : 1970    This patient is a participant at [facility] [place] and attended the weekly virtual nutrition class hosted via [employer].      Dania Rhoades, MS, RD, LDN

## 2024-10-10 ENCOUNTER — OFFICE VISIT (OUTPATIENT)
Age: 54
End: 2024-10-10

## 2024-10-10 DIAGNOSIS — E66.01 CLASS 3 SEVERE OBESITY DUE TO EXCESS CALORIES WITH SERIOUS COMORBIDITY AND BODY MASS INDEX (BMI) OF 50.0 TO 59.9 IN ADULT: Primary | ICD-10-CM

## 2024-10-10 DIAGNOSIS — E66.813 CLASS 3 SEVERE OBESITY DUE TO EXCESS CALORIES WITH SERIOUS COMORBIDITY AND BODY MASS INDEX (BMI) OF 50.0 TO 59.9 IN ADULT: Primary | ICD-10-CM

## 2024-10-15 NOTE — PROGRESS NOTES
[facility]  Metabolic Weight Loss Program        Patient's Name: Nataliya Camacho  : 1970    This patient is a participant at [facility] [place] and attended the weekly virtual nutrition class hosted via [employer].      Dania Rhoades, MS, RD, LDN

## 2024-10-16 ENCOUNTER — OFFICE VISIT (OUTPATIENT)
Age: 54
End: 2024-10-16

## 2024-10-16 DIAGNOSIS — E66.01 CLASS 3 SEVERE OBESITY DUE TO EXCESS CALORIES WITH SERIOUS COMORBIDITY AND BODY MASS INDEX (BMI) OF 50.0 TO 59.9 IN ADULT: Primary | ICD-10-CM

## 2024-10-16 DIAGNOSIS — E66.813 CLASS 3 SEVERE OBESITY DUE TO EXCESS CALORIES WITH SERIOUS COMORBIDITY AND BODY MASS INDEX (BMI) OF 50.0 TO 59.9 IN ADULT: Primary | ICD-10-CM

## 2024-10-29 ENCOUNTER — OFFICE VISIT (OUTPATIENT)
Age: 54
End: 2024-10-29
Payer: COMMERCIAL

## 2024-10-29 VITALS
DIASTOLIC BLOOD PRESSURE: 83 MMHG | RESPIRATION RATE: 18 BRPM | OXYGEN SATURATION: 96 % | HEIGHT: 64 IN | TEMPERATURE: 98.7 F | HEART RATE: 103 BPM | BODY MASS INDEX: 50.02 KG/M2 | WEIGHT: 293 LBS | SYSTOLIC BLOOD PRESSURE: 130 MMHG

## 2024-10-29 DIAGNOSIS — I10 ESSENTIAL HYPERTENSION: ICD-10-CM

## 2024-10-29 DIAGNOSIS — E66.813 CLASS 3 SEVERE OBESITY DUE TO EXCESS CALORIES WITH SERIOUS COMORBIDITY AND BODY MASS INDEX (BMI) OF 50.0 TO 59.9 IN ADULT: ICD-10-CM

## 2024-10-29 DIAGNOSIS — E66.01 CLASS 3 SEVERE OBESITY DUE TO EXCESS CALORIES WITH SERIOUS COMORBIDITY AND BODY MASS INDEX (BMI) OF 50.0 TO 59.9 IN ADULT: ICD-10-CM

## 2024-10-29 DIAGNOSIS — G47.33 OSA ON CPAP: ICD-10-CM

## 2024-10-29 DIAGNOSIS — E66.01 CLASS 3 SEVERE OBESITY DUE TO EXCESS CALORIES WITH SERIOUS COMORBIDITY AND BODY MASS INDEX (BMI) OF 50.0 TO 59.9 IN ADULT: Primary | ICD-10-CM

## 2024-10-29 DIAGNOSIS — E78.00 HYPERCHOLESTEROLEMIA: ICD-10-CM

## 2024-10-29 DIAGNOSIS — E66.813 CLASS 3 SEVERE OBESITY DUE TO EXCESS CALORIES WITH SERIOUS COMORBIDITY AND BODY MASS INDEX (BMI) OF 50.0 TO 59.9 IN ADULT: Primary | ICD-10-CM

## 2024-10-29 DIAGNOSIS — E11.9 TYPE 2 DIABETES MELLITUS WITHOUT COMPLICATION, WITHOUT LONG-TERM CURRENT USE OF INSULIN (HCC): ICD-10-CM

## 2024-10-29 PROCEDURE — 3079F DIAST BP 80-89 MM HG: CPT | Performed by: FAMILY MEDICINE

## 2024-10-29 PROCEDURE — 3044F HG A1C LEVEL LT 7.0%: CPT | Performed by: FAMILY MEDICINE

## 2024-10-29 PROCEDURE — 3075F SYST BP GE 130 - 139MM HG: CPT | Performed by: FAMILY MEDICINE

## 2024-10-29 PROCEDURE — 99214 OFFICE O/P EST MOD 30 MIN: CPT | Performed by: FAMILY MEDICINE

## 2024-10-29 ASSESSMENT — PATIENT HEALTH QUESTIONNAIRE - PHQ9
2. FEELING DOWN, DEPRESSED OR HOPELESS: NOT AT ALL
SUM OF ALL RESPONSES TO PHQ QUESTIONS 1-9: 0
1. LITTLE INTEREST OR PLEASURE IN DOING THINGS: NOT AT ALL
SUM OF ALL RESPONSES TO PHQ QUESTIONS 1-9: 0
SUM OF ALL RESPONSES TO PHQ9 QUESTIONS 1 & 2: 0

## 2024-10-29 NOTE — PROGRESS NOTES
[facility] Progress Note:   F/up Physician Visit    CC: Weight Management      Nataliya Camacho is a 54 y.o. female who is here for her  f/up physician visit for the  LCD Program.  She lives in [facility]   Sept 301  Oct 305    Home scale with no clothes she was 301  She has been eating a lot of cake and pies this week.   At work there are miniature candies and she eats them      She takes gabapentin to control neck pain. When she does not take it she gets bad neck pain            10/29/2024     3:14 PM 10/29/2024     3:00 PM 9/27/2024     8:30 AM 8/30/2024     8:30 AM 7/25/2024     2:28 PM 7/25/2024     2:00 PM 3/22/2024    10:15 AM   Weight Metrics   Weight 305 lb 6.4 oz  301 lb 301 lb 6.4 oz 299 lb  303 lb   Neck (Inches)  16 in    15.5 in    Waist Measure Inches  53.75 in    53 in    Body Fat %  50.6 %    51.1 %    BMI (Calculated) 52.5 kg/m2  51.8 kg/m2 51.8 kg/m2 51.4 kg/m2  52.1 kg/m2          No data to display                   Current Outpatient Medications   Medication Sig Dispense Refill    ACCU-CHEK GUIDE strip 1 each daily      mupirocin (BACTROBAN) 2 % ointment as needed      OZEMPIC, 2 MG/DOSE, 8 MG/3ML SOPN sc injection Inject 2 mg into the skin every 7 days      Chromium-Cinnamon (CINNAMON PLUS CHROMIUM PO) Take 1 capsule by mouth daily      atorvastatin (LIPITOR) 20 MG tablet Take 1 tablet by mouth nightly      turmeric 500 MG CAPS Take 1 capsule by mouth daily      Menaquinone-7 (VITAMIN K2 PO) Take 1 capsule by mouth daily      Coenzyme Q10 (CO Q 10 PO) Take 1 capsule by mouth daily      Tazarotene 0.045 % LOTN Apply topically at bedtime      acyclovir (ZOVIRAX) 5 % ointment Apply topically as needed      clindamycin (CLEOCIN T) 1 % external solution Apply topically as needed      Clascoterone (WINLEVI) 1 % CREA Apply topically daily      EPINEPHrine (EPIPEN) 0.3 MG/0.3ML SOAJ injection Inject 0.3 mLs into the muscle once AS NEEDED      HYDROcodone-chlorpheniramine (TUSSIONEX)

## 2024-10-29 NOTE — PROGRESS NOTES
Identified pt with two pt identifiers (name and ). Reviewed chart in preparation for visit and have obtained necessary documentation.    Nataliya Camacho is a 54 y.o. female  Chief Complaint   Patient presents with    Weight Management     1 month follow up     /83 (Site: Right Upper Arm, Position: Sitting, Cuff Size: Large Adult)   Pulse (!) 103   Temp 98.7 °F (37.1 °C) (Oral)   Resp 18   Ht 1.626 m (5' 4\")   Wt (!) 138.5 kg (305 lb 6.4 oz)   SpO2 96%   BMI 52.42 kg/m²     1. Have you been to the ER, urgent care clinic since your last visit?  Hospitalized since your last visit?no    2. Have you seen or consulted any other health care providers outside of the [facility] since your last visit?  Include any pap smears or colon screening. No    BMI - 52.4

## 2024-10-30 ENCOUNTER — OFFICE VISIT (OUTPATIENT)
Age: 54
End: 2024-10-30

## 2024-10-30 DIAGNOSIS — E66.01 CLASS 3 SEVERE OBESITY DUE TO EXCESS CALORIES WITH SERIOUS COMORBIDITY AND BODY MASS INDEX (BMI) OF 50.0 TO 59.9 IN ADULT: Primary | ICD-10-CM

## 2024-10-30 DIAGNOSIS — E66.813 CLASS 3 SEVERE OBESITY DUE TO EXCESS CALORIES WITH SERIOUS COMORBIDITY AND BODY MASS INDEX (BMI) OF 50.0 TO 59.9 IN ADULT: Primary | ICD-10-CM

## 2024-10-30 LAB
ALBUMIN SERPL-MCNC: 3.9 G/DL (ref 3.8–4.9)
ALP SERPL-CCNC: 124 IU/L (ref 44–121)
ALT SERPL-CCNC: 30 IU/L (ref 0–32)
AST SERPL-CCNC: 26 IU/L (ref 0–40)
BILIRUB SERPL-MCNC: <0.2 MG/DL (ref 0–1.2)
BUN SERPL-MCNC: 14 MG/DL (ref 6–24)
BUN/CREAT SERPL: 18 (ref 9–23)
CALCIUM SERPL-MCNC: 9 MG/DL (ref 8.7–10.2)
CHLORIDE SERPL-SCNC: 103 MMOL/L (ref 96–106)
CHOLEST SERPL-MCNC: 119 MG/DL (ref 100–199)
CO2 SERPL-SCNC: 24 MMOL/L (ref 20–29)
CREAT SERPL-MCNC: 0.77 MG/DL (ref 0.57–1)
EGFRCR SERPLBLD CKD-EPI 2021: 90 ML/MIN/1.73
GLOBULIN SER CALC-MCNC: 2.8 G/DL (ref 1.5–4.5)
GLUCOSE SERPL-MCNC: 85 MG/DL (ref 70–99)
HBA1C MFR BLD: 6.4 % (ref 4.8–5.6)
HDLC SERPL-MCNC: 47 MG/DL
LDLC SERPL CALC-MCNC: 53 MG/DL (ref 0–99)
POTASSIUM SERPL-SCNC: 3.9 MMOL/L (ref 3.5–5.2)
PROT SERPL-MCNC: 6.7 G/DL (ref 6–8.5)
SODIUM SERPL-SCNC: 142 MMOL/L (ref 134–144)
TRIGL SERPL-MCNC: 99 MG/DL (ref 0–149)
VLDLC SERPL CALC-MCNC: 19 MG/DL (ref 5–40)

## 2024-11-01 NOTE — PROGRESS NOTES
[facility]  Metabolic Weight Loss Program        Patient's Name: Nataliya Camacho  : 1970    This patient is a participant at [facility] [place] and attended the weekly virtual nutrition class hosted via [employer].      Dania Rhoades, MS, RD, LDN

## 2024-11-06 ENCOUNTER — OFFICE VISIT (OUTPATIENT)
Age: 54
End: 2024-11-06

## 2024-11-06 DIAGNOSIS — E66.813 CLASS 3 SEVERE OBESITY DUE TO EXCESS CALORIES WITH SERIOUS COMORBIDITY AND BODY MASS INDEX (BMI) OF 50.0 TO 59.9 IN ADULT: Primary | ICD-10-CM

## 2024-11-06 DIAGNOSIS — E66.01 CLASS 3 SEVERE OBESITY DUE TO EXCESS CALORIES WITH SERIOUS COMORBIDITY AND BODY MASS INDEX (BMI) OF 50.0 TO 59.9 IN ADULT: Primary | ICD-10-CM

## 2024-11-12 NOTE — PROGRESS NOTES
[facility]  Metabolic Weight Loss Program        Patient's Name: Nataliya Camacho  : 1970    This patient is a participant at [facility] [place] and attended the weekly virtual nutrition class hosted via [employer].      Dania Rhoades, MS, RD, LDN

## 2024-11-20 ENCOUNTER — OFFICE VISIT (OUTPATIENT)
Age: 54
End: 2024-11-20

## 2024-11-20 DIAGNOSIS — E66.813 CLASS 3 SEVERE OBESITY DUE TO EXCESS CALORIES WITH SERIOUS COMORBIDITY AND BODY MASS INDEX (BMI) OF 50.0 TO 59.9 IN ADULT: Primary | ICD-10-CM

## 2024-11-20 DIAGNOSIS — E66.01 CLASS 3 SEVERE OBESITY DUE TO EXCESS CALORIES WITH SERIOUS COMORBIDITY AND BODY MASS INDEX (BMI) OF 50.0 TO 59.9 IN ADULT: Primary | ICD-10-CM

## 2024-11-25 NOTE — PROGRESS NOTES
[facility]  Metabolic Weight Loss Program        Patient's Name: Nataliya Camacho  : 1970    This patient is a participant at [facility] [place] and attended the weekly virtual nutrition class hosted via [employer].      Dania Rhoades, MS, RD, LDN

## 2024-12-04 ENCOUNTER — OFFICE VISIT (OUTPATIENT)
Age: 54
End: 2024-12-04

## 2024-12-04 DIAGNOSIS — E66.01 CLASS 3 SEVERE OBESITY DUE TO EXCESS CALORIES WITH SERIOUS COMORBIDITY AND BODY MASS INDEX (BMI) OF 50.0 TO 59.9 IN ADULT: Primary | ICD-10-CM

## 2024-12-04 DIAGNOSIS — E66.813 CLASS 3 SEVERE OBESITY DUE TO EXCESS CALORIES WITH SERIOUS COMORBIDITY AND BODY MASS INDEX (BMI) OF 50.0 TO 59.9 IN ADULT: Primary | ICD-10-CM

## 2024-12-13 ENCOUNTER — TELEMEDICINE (OUTPATIENT)
Age: 54
End: 2024-12-13
Payer: COMMERCIAL

## 2024-12-13 VITALS
BODY MASS INDEX: 50.02 KG/M2 | HEIGHT: 64 IN | SYSTOLIC BLOOD PRESSURE: 127 MMHG | WEIGHT: 293 LBS | DIASTOLIC BLOOD PRESSURE: 83 MMHG

## 2024-12-13 DIAGNOSIS — E66.813 CLASS 3 SEVERE OBESITY DUE TO EXCESS CALORIES WITH SERIOUS COMORBIDITY AND BODY MASS INDEX (BMI) OF 50.0 TO 59.9 IN ADULT: Primary | ICD-10-CM

## 2024-12-13 DIAGNOSIS — E66.01 CLASS 3 SEVERE OBESITY DUE TO EXCESS CALORIES WITH SERIOUS COMORBIDITY AND BODY MASS INDEX (BMI) OF 50.0 TO 59.9 IN ADULT: Primary | ICD-10-CM

## 2024-12-13 DIAGNOSIS — E78.00 HYPERCHOLESTEROLEMIA: ICD-10-CM

## 2024-12-13 DIAGNOSIS — E11.9 TYPE 2 DIABETES MELLITUS WITHOUT COMPLICATION, WITHOUT LONG-TERM CURRENT USE OF INSULIN (HCC): ICD-10-CM

## 2024-12-13 DIAGNOSIS — I10 ESSENTIAL HYPERTENSION: ICD-10-CM

## 2024-12-13 DIAGNOSIS — G47.33 OSA ON CPAP: ICD-10-CM

## 2024-12-13 PROCEDURE — 3044F HG A1C LEVEL LT 7.0%: CPT | Performed by: FAMILY MEDICINE

## 2024-12-13 PROCEDURE — 3079F DIAST BP 80-89 MM HG: CPT | Performed by: FAMILY MEDICINE

## 2024-12-13 PROCEDURE — 99214 OFFICE O/P EST MOD 30 MIN: CPT | Performed by: FAMILY MEDICINE

## 2024-12-13 PROCEDURE — 3074F SYST BP LT 130 MM HG: CPT | Performed by: FAMILY MEDICINE

## 2024-12-13 RX ORDER — MECLIZINE HYDROCHLORIDE 25 MG/1
25 TABLET ORAL 3 TIMES DAILY PRN
COMMUNITY
Start: 2024-11-14

## 2024-12-13 ASSESSMENT — PATIENT HEALTH QUESTIONNAIRE - PHQ9
SUM OF ALL RESPONSES TO PHQ QUESTIONS 1-9: 0
SUM OF ALL RESPONSES TO PHQ QUESTIONS 1-9: 0
1. LITTLE INTEREST OR PLEASURE IN DOING THINGS: NOT AT ALL
SUM OF ALL RESPONSES TO PHQ QUESTIONS 1-9: 0
SUM OF ALL RESPONSES TO PHQ9 QUESTIONS 1 & 2: 0
2. FEELING DOWN, DEPRESSED OR HOPELESS: NOT AT ALL
SUM OF ALL RESPONSES TO PHQ QUESTIONS 1-9: 0

## 2024-12-13 NOTE — PROGRESS NOTES
Identified pt with two pt identifiers (name and ). Reviewed chart in preparation for visit and have obtained necessary documentation.    Nataliya Camacho is a 54 y.o. female  Chief Complaint   Patient presents with    Weight Management     [facility]     /83   Ht 1.626 m (5' 4\")   Wt (!) 137.4 kg (303 lb)   BMI 52.01 kg/m²     1. Have you been to the ER, urgent care clinic since your last visit?  Hospitalized since your last visit?no    2. Have you seen or consulted any other health care providers outside of the [facility] since your last visit?  Include any pap smears or colon screening. Yes - endocrinologist and PCP

## 2024-12-13 NOTE — PROGRESS NOTES
[facility] Progress Note:   F/up Physician Visit    Nataliya Camacho is a 54 y.o. female who was seen by synchronous (real-time) audio-video technology on 12/13/2024.      Consent:  She and/or her healthcare decision maker is aware that this patient-initiated Telehealth encounter is a billable service, with coverage as determined by her insurance carrier. She is aware that she may receive a bill and has provided verbal consent to proceed: Yes    Nataliya Camacho, was evaluated through a synchronous (real-time) audio-video encounter. The patient (or guardian if applicable) is aware that this is a billable service, which includes applicable co-pays. This Virtual Visit was conducted with patient's (and/or legal guardian's) consent. Patient identification was verified, and a caregiver was present when appropriate.   The patient was located at Home: [address] [place] [id number]  Provider was located at Home (Appt Dept State[place]  Confirm you are appropriately licensed, registered, or certified to deliver care in the state where the patient is located as indicated above. If you are not or unsure, please re-schedule the visit: Yes, I confirm.          --Ingrid Morgan MD on 12/13/2024 at 9:19 AM           [phone]  Subjective:   Nataliya Camacho was seen for Weight Management (WLC)    f/up physician visit for the / LCD Program.      Oct   305  Now 303    Doing less candy and sweets   She is taking classes after work so not doing any exercise      Prior to Admission medications    Medication Sig Start Date End Date Taking? Authorizing Provider   meclizine (ANTIVERT) 25 MG tablet Take 1 tablet by mouth 3 times daily as needed for Dizziness 11/14/24  Yes Brittney Reilly MD   ACCU-CHEK GUIDE strip 1 each daily 9/3/24  Yes Brittney Reilly MD   mupirocin (BACTROBAN) 2 % ointment as needed 9/4/24  Yes Brittney Reilly MD   OZEMPIC, 2 MG/DOSE, 8 MG/3ML SOPN sc injection Inject 2 mg into the

## 2025-01-08 ENCOUNTER — OFFICE VISIT (OUTPATIENT)
Age: 55
End: 2025-01-08

## 2025-01-08 DIAGNOSIS — E66.01 CLASS 3 SEVERE OBESITY DUE TO EXCESS CALORIES WITH SERIOUS COMORBIDITY AND BODY MASS INDEX (BMI) OF 50.0 TO 59.9 IN ADULT: Primary | ICD-10-CM

## 2025-01-08 DIAGNOSIS — E66.813 CLASS 3 SEVERE OBESITY DUE TO EXCESS CALORIES WITH SERIOUS COMORBIDITY AND BODY MASS INDEX (BMI) OF 50.0 TO 59.9 IN ADULT: Primary | ICD-10-CM

## 2025-01-10 NOTE — PROGRESS NOTES
[facility]  Metabolic Weight Loss Program        Patient's Name: Nataliya Camacho  : 1970    This patient is a participant at [facility] [place] and attended the weekly virtual nutrition class hosted via [employer].      Dania Rhoades, MS, RD, LDN

## 2025-01-20 ENCOUNTER — TELEPHONE (OUTPATIENT)
Age: 55
End: 2025-01-20

## 2025-01-20 NOTE — TELEPHONE ENCOUNTER
Attempted to call patient to confirm their upcoming appointment, unable to LVM for patient to contact our office if they are not able to make this appointment. Not a working phone number.

## 2025-02-05 ENCOUNTER — OFFICE VISIT (OUTPATIENT)
Age: 55
End: 2025-02-05

## 2025-02-05 DIAGNOSIS — E66.01 CLASS 3 SEVERE OBESITY DUE TO EXCESS CALORIES WITH SERIOUS COMORBIDITY AND BODY MASS INDEX (BMI) OF 50.0 TO 59.9 IN ADULT: Primary | ICD-10-CM

## 2025-02-05 DIAGNOSIS — E66.813 CLASS 3 SEVERE OBESITY DUE TO EXCESS CALORIES WITH SERIOUS COMORBIDITY AND BODY MASS INDEX (BMI) OF 50.0 TO 59.9 IN ADULT: Primary | ICD-10-CM

## 2025-02-07 NOTE — PROGRESS NOTES
[facility]  Metabolic Weight Loss Program        Patient's Name: Nataliya Camacho  : 1970    This patient is a participant at [facility] [place] and attended the weekly virtual nutrition class hosted via [employer].      Dania Rhoades, MS, RD, LDN

## 2025-02-12 ENCOUNTER — OFFICE VISIT (OUTPATIENT)
Age: 55
End: 2025-02-12

## 2025-02-12 DIAGNOSIS — E66.813 CLASS 3 SEVERE OBESITY DUE TO EXCESS CALORIES WITH SERIOUS COMORBIDITY AND BODY MASS INDEX (BMI) OF 50.0 TO 59.9 IN ADULT: Primary | ICD-10-CM

## 2025-02-12 DIAGNOSIS — E66.01 CLASS 3 SEVERE OBESITY DUE TO EXCESS CALORIES WITH SERIOUS COMORBIDITY AND BODY MASS INDEX (BMI) OF 50.0 TO 59.9 IN ADULT: Primary | ICD-10-CM

## 2025-02-14 NOTE — PROGRESS NOTES
[facility]  Metabolic Weight Loss Program        Patient's Name: Nataliya Camacho  : 1970    This patient is a participant at [facility] [place] and attended the weekly virtual nutrition class hosted via [employer].      Dania Rhoades, MS, RD, LDN

## 2025-03-04 ENCOUNTER — OFFICE VISIT (OUTPATIENT)
Age: 55
End: 2025-03-04
Payer: COMMERCIAL

## 2025-03-04 VITALS
BODY MASS INDEX: 50.02 KG/M2 | DIASTOLIC BLOOD PRESSURE: 89 MMHG | WEIGHT: 293 LBS | TEMPERATURE: 98.7 F | SYSTOLIC BLOOD PRESSURE: 139 MMHG | OXYGEN SATURATION: 96 % | HEIGHT: 64 IN | HEART RATE: 99 BPM | RESPIRATION RATE: 20 BRPM

## 2025-03-04 DIAGNOSIS — G47.33 OSA ON CPAP: ICD-10-CM

## 2025-03-04 DIAGNOSIS — E66.01 CLASS 3 SEVERE OBESITY DUE TO EXCESS CALORIES WITH SERIOUS COMORBIDITY AND BODY MASS INDEX (BMI) OF 50.0 TO 59.9 IN ADULT: Primary | ICD-10-CM

## 2025-03-04 DIAGNOSIS — E66.813 CLASS 3 SEVERE OBESITY DUE TO EXCESS CALORIES WITH SERIOUS COMORBIDITY AND BODY MASS INDEX (BMI) OF 50.0 TO 59.9 IN ADULT: Primary | ICD-10-CM

## 2025-03-04 DIAGNOSIS — E78.00 HYPERCHOLESTEROLEMIA: ICD-10-CM

## 2025-03-04 DIAGNOSIS — E11.9 TYPE 2 DIABETES MELLITUS WITHOUT COMPLICATION, WITHOUT LONG-TERM CURRENT USE OF INSULIN (HCC): ICD-10-CM

## 2025-03-04 DIAGNOSIS — E66.01 CLASS 3 SEVERE OBESITY DUE TO EXCESS CALORIES WITH SERIOUS COMORBIDITY AND BODY MASS INDEX (BMI) OF 50.0 TO 59.9 IN ADULT: ICD-10-CM

## 2025-03-04 DIAGNOSIS — E66.813 CLASS 3 SEVERE OBESITY DUE TO EXCESS CALORIES WITH SERIOUS COMORBIDITY AND BODY MASS INDEX (BMI) OF 50.0 TO 59.9 IN ADULT: ICD-10-CM

## 2025-03-04 DIAGNOSIS — I89.0 ACQUIRED LYMPHEDEMA OF LOWER EXTREMITY: ICD-10-CM

## 2025-03-04 DIAGNOSIS — I10 ESSENTIAL HYPERTENSION: ICD-10-CM

## 2025-03-04 PROCEDURE — 3044F HG A1C LEVEL LT 7.0%: CPT | Performed by: FAMILY MEDICINE

## 2025-03-04 PROCEDURE — 3075F SYST BP GE 130 - 139MM HG: CPT | Performed by: FAMILY MEDICINE

## 2025-03-04 PROCEDURE — 3079F DIAST BP 80-89 MM HG: CPT | Performed by: FAMILY MEDICINE

## 2025-03-04 PROCEDURE — 99214 OFFICE O/P EST MOD 30 MIN: CPT | Performed by: FAMILY MEDICINE

## 2025-03-04 RX ORDER — AMLODIPINE BESYLATE 10 MG/1
10 TABLET ORAL DAILY
COMMUNITY
Start: 2025-02-18

## 2025-03-04 RX ORDER — BUPROPION HYDROCHLORIDE 150 MG/1
150 TABLET, EXTENDED RELEASE ORAL 2 TIMES DAILY
Qty: 60 TABLET | Refills: 3 | Status: SHIPPED | OUTPATIENT
Start: 2025-03-04

## 2025-03-04 ASSESSMENT — PATIENT HEALTH QUESTIONNAIRE - PHQ9
1. LITTLE INTEREST OR PLEASURE IN DOING THINGS: NOT AT ALL
SUM OF ALL RESPONSES TO PHQ QUESTIONS 1-9: 0
2. FEELING DOWN, DEPRESSED OR HOPELESS: NOT AT ALL
SUM OF ALL RESPONSES TO PHQ QUESTIONS 1-9: 0

## 2025-03-04 NOTE — PATIENT INSTRUCTIONS
Ask pcp for RX to Pool therapy    [facility]  [address] [place] [address]. [address]  [place] [address]  Phone: [phone]  Fax: [phone]

## 2025-03-04 NOTE — PROGRESS NOTES
Identified pt with two pt identifiers (name and ). Reviewed chart in preparation for visit and have obtained necessary documentation.    Nataliya Camacho is a 54 y.o. female  Chief Complaint   Patient presents with    Weight Management     3 month follow up     /89 (Site: Right Upper Arm, Position: Sitting, Cuff Size: Large Adult)   Pulse 99   Temp 98.7 °F (37.1 °C) (Oral)   Resp 20   Ht 1.626 m (5' 4\")   Wt (!) 138.8 kg (305 lb 14.4 oz)   SpO2 96%   BMI 52.51 kg/m²     1. Have you been to the ER, urgent care clinic since your last visit?  Hospitalized since your last visit?yes - [facility] for UTI & URI    2. Have you seen or consulted any other health care providers outside of the [facility] since your last visit?  Include any pap smears or colon screening. No    BMI - 52.5
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
## 2025-03-05 ENCOUNTER — OFFICE VISIT (OUTPATIENT)
Age: 55
End: 2025-03-05

## 2025-03-05 DIAGNOSIS — E66.813 CLASS 3 SEVERE OBESITY DUE TO EXCESS CALORIES WITH SERIOUS COMORBIDITY AND BODY MASS INDEX (BMI) OF 50.0 TO 59.9 IN ADULT: Primary | ICD-10-CM

## 2025-03-05 DIAGNOSIS — E66.01 CLASS 3 SEVERE OBESITY DUE TO EXCESS CALORIES WITH SERIOUS COMORBIDITY AND BODY MASS INDEX (BMI) OF 50.0 TO 59.9 IN ADULT: Primary | ICD-10-CM

## 2025-03-05 LAB
ALBUMIN SERPL-MCNC: 3.9 G/DL (ref 3.8–4.9)
ALP SERPL-CCNC: 110 IU/L (ref 44–121)
ALT SERPL-CCNC: 27 IU/L (ref 0–32)
AST SERPL-CCNC: 23 IU/L (ref 0–40)
BILIRUB SERPL-MCNC: 0.3 MG/DL (ref 0–1.2)
BUN SERPL-MCNC: 13 MG/DL (ref 6–24)
BUN/CREAT SERPL: 17 (ref 9–23)
CALCIUM SERPL-MCNC: 9.1 MG/DL (ref 8.7–10.2)
CHLORIDE SERPL-SCNC: 103 MMOL/L (ref 96–106)
CO2 SERPL-SCNC: 22 MMOL/L (ref 20–29)
CREAT SERPL-MCNC: 0.76 MG/DL (ref 0.57–1)
EGFRCR SERPLBLD CKD-EPI 2021: 92 ML/MIN/1.73
GLOBULIN SER CALC-MCNC: 2.8 G/DL (ref 1.5–4.5)
GLUCOSE SERPL-MCNC: 109 MG/DL (ref 70–99)
HBA1C MFR BLD: 6.2 % (ref 4.8–5.6)
POTASSIUM SERPL-SCNC: 3.5 MMOL/L (ref 3.5–5.2)
PROT SERPL-MCNC: 6.7 G/DL (ref 6–8.5)
SODIUM SERPL-SCNC: 141 MMOL/L (ref 134–144)

## 2025-03-07 NOTE — PROGRESS NOTES
[facility]  Metabolic Weight Loss Program        Patient's Name: Nataliya Camacho  : 1970    This patient is a participant at [facility] [place] and attended the weekly virtual nutrition class hosted via [employer].      Dania Rhoades, MS, RD, LDN

## 2025-03-25 ENCOUNTER — RESULTS FOLLOW-UP (OUTPATIENT)
Age: 55
End: 2025-03-25

## 2025-03-26 ENCOUNTER — OFFICE VISIT (OUTPATIENT)
Age: 55
End: 2025-03-26

## 2025-03-26 DIAGNOSIS — E66.01 MORBID OBESITY: Primary | ICD-10-CM

## 2025-03-28 ENCOUNTER — TELEPHONE (OUTPATIENT)
Facility: HOSPITAL | Age: 55
End: 2025-03-28

## 2025-03-28 NOTE — TELEPHONE ENCOUNTER
I called MsMadelineJanice to remind her of her upcoming appointment. She did not answer, so I left her a message with a reminder of her appointment date, time, and check in time.

## 2025-03-31 NOTE — PROGRESS NOTES
[facility]  Metabolic Weight Loss Program        Patient's Name: Nataliya Camacho  : 1970    This patient is enrolled in [facility] Loss Program and attended the required weekly virtual nutrition class hosted via [employer].      Birdie Little RD

## 2025-04-02 ENCOUNTER — TELEPHONE (OUTPATIENT)
Facility: HOSPITAL | Age: 55
End: 2025-04-02

## 2025-04-02 NOTE — TELEPHONE ENCOUNTER
Called pt to remind her of upcoming eval and asked pt to check in with us at 7:50 to fill out ppw. Reminded pt to bring her insurance card and ID.

## 2025-04-04 ENCOUNTER — HOSPITAL ENCOUNTER (OUTPATIENT)
Facility: HOSPITAL | Age: 55
Setting detail: RECURRING SERIES
Discharge: HOME | End: 2025-04-07
Payer: COMMERCIAL

## 2025-04-04 PROCEDURE — 97161 PT EVAL LOW COMPLEX 20 MIN: CPT

## 2025-04-04 NOTE — PROGRESS NOTES
ROBERT Johnson, MANJEET)          Details if applicable:       5  [facility] Totals Reminder: bill using total billable min of TIMED therapeutic procedures (example: do not include dry needle or estim unattended, both untimed codes, in totals to left)  8-22 min = 1 unit; 23-37 min = 2 units; 38-52 min = 3 units; 53-67 min = 4 units; 68-82 min = 5 units   Total Total     Charge Capture    [x]  Patient Education billed concurrently with other procedures   [x] Review HEP    [x] Progressed/Changed HEP, detail:    Access Code: [email or web address]  URL: [email or web address]  Date: 04/04/2025  Prepared by: Chano Voss    Exercises  - Supine Heel Slide with Strap  - 1-2 x daily - 7 x weekly - 10 reps - 5\" hold  - Seated Hamstring Stretch  - 1-2 x daily - 7 x weekly - 2-3 sets - 20-30\" hold  - Supine Quadriceps Stretch with Strap on Table  - 1-2 x daily - 7 x weekly - 2-3 sets - 20-30\" hold  - Supine Bridge  - 1 x daily - 7 x weekly - 3 sets - 10 reps  - Supine Active Straight Leg Raise  - 1 x daily - 7 x weekly - 3 sets - 10 reps  - Sidelying Hip Abduction  - 1 x daily - 7 x weekly - 3 sets - 10 reps    [] Other detail:       Objective Information/Functional Measures/Assessment    See POC    Patient will continue to benefit from skilled PT / OT services to modify and progress therapeutic interventions, analyze and address functional mobility deficits, analyze and address ROM deficits, analyze and address strength deficits, analyze and address soft tissue restrictions, analyze and cue for proper movement patterns, analyze and modify for postural abnormalities, and instruct in home and community integration to address functional deficits and attain remaining goals.    Progress toward goals / Updated goals:  [x]  See POC    See POC for goals and assessment     NEXT PN/RC DUE RECERT DATE   5/3/25 NA     Auth Visit Count Auth Expiration Date   Auth pending Auth pending       PLAN  yes Continue plan of care  [x]  Upgrade
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
## 2025-04-11 ENCOUNTER — TELEMEDICINE (OUTPATIENT)
Age: 55
End: 2025-04-11
Payer: COMMERCIAL

## 2025-04-11 VITALS — WEIGHT: 293 LBS | BODY MASS INDEX: 50.02 KG/M2 | HEIGHT: 64 IN

## 2025-04-11 DIAGNOSIS — G47.33 OSA ON CPAP: ICD-10-CM

## 2025-04-11 DIAGNOSIS — E66.813 CLASS 3 SEVERE OBESITY DUE TO EXCESS CALORIES WITH SERIOUS COMORBIDITY AND BODY MASS INDEX (BMI) OF 50.0 TO 59.9 IN ADULT: Primary | ICD-10-CM

## 2025-04-11 DIAGNOSIS — I10 ESSENTIAL HYPERTENSION: ICD-10-CM

## 2025-04-11 DIAGNOSIS — E87.6 HYPOKALEMIA: ICD-10-CM

## 2025-04-11 DIAGNOSIS — E11.9 TYPE 2 DIABETES MELLITUS WITHOUT COMPLICATION, WITHOUT LONG-TERM CURRENT USE OF INSULIN: ICD-10-CM

## 2025-04-11 DIAGNOSIS — E78.00 HYPERCHOLESTEROLEMIA: ICD-10-CM

## 2025-04-11 PROCEDURE — 99214 OFFICE O/P EST MOD 30 MIN: CPT | Performed by: FAMILY MEDICINE

## 2025-04-11 PROCEDURE — 3044F HG A1C LEVEL LT 7.0%: CPT | Performed by: FAMILY MEDICINE

## 2025-04-11 RX ORDER — DOXYCYCLINE 100 MG/1
100 CAPSULE ORAL DAILY
COMMUNITY
Start: 2025-04-01

## 2025-04-11 ASSESSMENT — PATIENT HEALTH QUESTIONNAIRE - PHQ9
SUM OF ALL RESPONSES TO PHQ QUESTIONS 1-9: 0
SUM OF ALL RESPONSES TO PHQ QUESTIONS 1-9: 0
1. LITTLE INTEREST OR PLEASURE IN DOING THINGS: NOT AT ALL
SUM OF ALL RESPONSES TO PHQ QUESTIONS 1-9: 0
SUM OF ALL RESPONSES TO PHQ QUESTIONS 1-9: 0
2. FEELING DOWN, DEPRESSED OR HOPELESS: NOT AT ALL

## 2025-04-11 NOTE — PROGRESS NOTES
[facility] Progress Note:   F/up Physician Visit    Nataliya Camacho is a 54 y.o. female who was seen by synchronous (real-time) audio-video technology on 4/11/2025.      Consent:  She and/or her healthcare decision maker is aware that this patient-initiated Telehealth encounter is a billable service, with coverage as determined by her insurance carrier. She is aware that she may receive a bill and has provided verbal consent to proceed: Yes    Nataliya Camacho, was evaluated through a synchronous (real-time) audio-video encounter. The patient (or guardian if applicable) is aware that this is a billable service, which includes applicable co-pays. This Virtual Visit was conducted with patient's (and/or legal guardian's) consent. Patient identification was verified, and a caregiver was present when appropriate.   The patient was located at Home: [address] [place] [address]  Provider was located at Home (Appt Dept State): [place]  Confirm you are appropriately licensed, registered, or certified to deliver care in the state where the patient is located as indicated above. If you are not or unsure, please re-schedule the visit: Yes, I confirm.          --Ingrid Morgan MD on 4/14/2025 at 7:22 AM           712  Subjective:   Nataliya Camacho was seen for Weight Management (WLC/  1 month )    f/up physician visit for the / LCD Program.  March 305  Now 304  May 5th she will start pool therapy  Not taking wellbutrin, she wants to wait until she gets back from a soon international trip    She is taking ozempic        Prior to Admission medications    Medication Sig Start Date End Date Taking? Authorizing Provider   doxycycline hyclate (VIBRAMYCIN) 100 MG capsule Take 1 capsule by mouth daily 4/1/25  Yes ProviderBrittney MD   amLODIPine (NORVASC) 10 MG tablet Take 0.5 tablets by mouth daily 2/18/25  Yes ProviderBrittney MD   meclizine (ANTIVERT) 25 MG tablet Take 1 tablet by mouth 3 times

## 2025-04-11 NOTE — PROGRESS NOTES
Identified pt with two pt identifiers (name and ). Reviewed chart in preparation for visit and have obtained necessary documentation.    Nataliya Camacho is a 54 y.o. female  Chief Complaint   Patient presents with    Weight Management     WLC/  1 month      Ht 1.626 m (5' 4\")   Wt (!) 137.9 kg (304 lb)   BMI 52.18 kg/m²     1. Have you been to the ER, urgent care clinic since your last visit?  Hospitalized since your last visit? [facility]- swelling in left leg. Did ultrasound and labs.     2. Have you seen or consulted any other health care providers outside of the [facility] since your last visit?  Include any pap smears or colon screening. No    Patient attended triage but did not bring homework form. Patient instructed to email or fax completed homework form to us. Patient informed that not bringing the homework form can result in not being seen next time.

## 2025-05-05 ENCOUNTER — TELEPHONE (OUTPATIENT)
Facility: HOSPITAL | Age: 55
End: 2025-05-05

## 2025-05-05 NOTE — TELEPHONE ENCOUNTER
Spoke to pt concerning missed/late arrival appt.  Informed patient of policy and of next appt time and date.

## 2025-05-07 ENCOUNTER — HOSPITAL ENCOUNTER (OUTPATIENT)
Facility: HOSPITAL | Age: 55
Setting detail: RECURRING SERIES
Discharge: HOME | End: 2025-05-10
Payer: COMMERCIAL

## 2025-05-07 PROCEDURE — 97113 AQUATIC THERAPY/EXERCISES: CPT

## 2025-05-07 NOTE — PROGRESS NOTES
PHYSICAL / OCCUPATIONAL THERAPY - DAILY TREATMENT NOTE (updated )    Patient Name: Nataliya Camacho    Date: 2025    : 1970  Insurance: Payor: RISSA PRESTON / Plan: [employer] [place] [employer] / Product Type: *No Product type* /      Patient  verified Yes     Visit #   Current / Total 2 16   Time   In / Out 330 400   Pain   In / Out 5-6 5   Subjective Functional Status/Changes: Pt reports she has just returned from a month long vacation.  Says that while on vacation she walked all day and did not notice pain until end of day.  Says that there were incidences of mm cramping in hamstrings.     TREATMENT AREA =  Other chronic pain [G89.29]  Other obesity due to excess calories [E66.09]  Pain in right knee [M25.561]  Pain in left knee [M25.562]    OBJECTIVE         Therapeutic Procedures:    Tx Min Billable or 1:1 Min (if diff from Tx Min) Procedure, Rationale, Specifics     13184 Therapeutic Exercise (timed):  increase ROM, strength, coordination, balance, and proprioception to improve patient's ability to progress to PLOF and address remaining functional goals. (see flow sheet as applicable)     Details if applicable:         72769 Neuromuscular Re-Education (timed):  improve balance, coordination, kinesthetic sense, posture, core stability and proprioception to improve patient's ability to develop conscious control of individual muscles and awareness of position of extremities in order to progress to PLOF and address remaining functional goals. (see flow sheet as applicable)     Details if applicable:       83129 Manual Therapy (timed):  decrease pain, increase ROM, increase tissue extensibility, decrease edema, correct positional vertigo, decrease trigger points, and increase postural awareness to improve patient's ability to progress to PLOF and address remaining functional goals.  The manual therapy interventions were performed at a separate and distinct time from the therapeutic activities interventions

## 2025-05-07 NOTE — THERAPY RECERTIFICATION
[facility]  [address],[place] [address] - Ph: [phone]  Fx: [phone]  PHYSICAL THERAPY PROGRESS NOTE  Patient Name: Nataliya Camacho : 1970   Medical/Treatment Diagnosis: Other chronic pain  Other obesity due to excess calories  Pain in right knee  Pain in left knee   Referral Source: Courtney Self MD     Date of Initial Visit: 25 Attended Visits: 2 Missed Visits: 1NS     SUMMARY OF TREATMENT  Pt was seen for IE and 1 f/u visits with treatment consisting of therapeutic exercise, therapeutic activity, neuromuscular activity, manual therapy, activity modification/progression, and Pt ED to improve function strengthening along with instruction of HEP.  CURRENT STATUS     Patient has had a lapse in care of over 1 month d/t being OOT. All measurements below remain unchanged due to this lapse in care and were taken at the time of initial evaluation.    Pain:   Current: 5-10     Worst: 10/10    Best: 4/10      Objective:    Functional Activity:  Quad set - strong  SLR - IND no lag B LE  Gait - \"waddling\" gait pattern  Squat - uses UE support against wall with decr B knee flexion  Bed mobility - IND     ROM/Strength         AROM                            PROM                            Strength (1-5)  Hip Left Right Left Right Left Right   Flexion - - - - 3+/5 4-/5   Extension - - - - - -   Abduction - - - - 3+/5 3+/5   Adduction - - - - - -   ER 25 15 - - 4/5 3+/5   IR 20 20 - - 4/5 4/5   Knee Left Right Left Right Left Right   Extension 100 0 110 0 4/5 4/5   Flexion 90 0 105 0 4/5 4/5     Pt will be IND with HEP to facilitate self care management.   Status at last Eval: Given at IE  Current Status: n/a  Goal Met?  n/a    2.  Pt will be able to perform walking in the pool >/= 10 min without rest breaks in order to improve endurance for ambulation and for carryover to land PT.   Status at last Eval: established  Current

## 2025-05-09 ENCOUNTER — TELEMEDICINE (OUTPATIENT)
Age: 55
End: 2025-05-09
Payer: COMMERCIAL

## 2025-05-09 VITALS — HEIGHT: 64 IN | WEIGHT: 293 LBS | BODY MASS INDEX: 50.02 KG/M2

## 2025-05-09 DIAGNOSIS — E11.9 TYPE 2 DIABETES MELLITUS WITHOUT COMPLICATION, WITHOUT LONG-TERM CURRENT USE OF INSULIN (HCC): ICD-10-CM

## 2025-05-09 DIAGNOSIS — G47.33 OSA ON CPAP: ICD-10-CM

## 2025-05-09 DIAGNOSIS — E66.813 CLASS 3 SEVERE OBESITY DUE TO EXCESS CALORIES WITH SERIOUS COMORBIDITY AND BODY MASS INDEX (BMI) OF 50.0 TO 59.9 IN ADULT (HCC): Primary | ICD-10-CM

## 2025-05-09 DIAGNOSIS — E78.00 HYPERCHOLESTEROLEMIA: ICD-10-CM

## 2025-05-09 DIAGNOSIS — I10 ESSENTIAL HYPERTENSION: ICD-10-CM

## 2025-05-09 PROCEDURE — 99214 OFFICE O/P EST MOD 30 MIN: CPT | Performed by: FAMILY MEDICINE

## 2025-05-09 PROCEDURE — 3044F HG A1C LEVEL LT 7.0%: CPT | Performed by: FAMILY MEDICINE

## 2025-05-09 RX ORDER — BENZONATATE 100 MG/1
100 CAPSULE ORAL 2 TIMES DAILY PRN
COMMUNITY
Start: 2025-02-17

## 2025-05-09 ASSESSMENT — PATIENT HEALTH QUESTIONNAIRE - PHQ9
SUM OF ALL RESPONSES TO PHQ QUESTIONS 1-9: 0
SUM OF ALL RESPONSES TO PHQ QUESTIONS 1-9: 0
1. LITTLE INTEREST OR PLEASURE IN DOING THINGS: NOT AT ALL
2. FEELING DOWN, DEPRESSED OR HOPELESS: NOT AT ALL
SUM OF ALL RESPONSES TO PHQ QUESTIONS 1-9: 0
SUM OF ALL RESPONSES TO PHQ QUESTIONS 1-9: 0

## 2025-05-09 NOTE — PROGRESS NOTES
[facility] Progress Note:   F/up Physician Visit    Nataliya Camacho is a 54 y.o. female who was seen by synchronous (real-time) audio-video technology on 5/9/2025.      Consent:  She and/or her healthcare decision maker is aware that this patient-initiated Telehealth encounter is a billable service, with coverage as determined by her insurance carrier. She is aware that she may receive a bill and has provided verbal consent to proceed: Yes    Nataliya Camacho, was evaluated through a synchronous (real-time) audio-video encounter. The patient (or guardian if applicable) is aware that this is a billable service, which includes applicable co-pays. This Virtual Visit was conducted with patient's (and/or legal guardian's) consent. Patient identification was verified, and a caregiver was present when appropriate.   The patient was located at Home: [address] [place] [address]  Provider was located at Home (Appt Dept State[place]  Confirm you are appropriately licensed, registered, or certified to deliver care in the state where the patient is located as indicated above. If you are not or unsure, please re-schedule the visit: Yes, I confirm.          --Ingrid Morgan MD on 5/9/2025 at 8:48 AM           712  Subjective:   Nataliya Camacho was seen for Weight Management (WLC/ 1 month )    f/up physician visit for the  LCD Program.    April 304  Now 299    Went to ER Tuesday for N/V and chest pain and SOB    She had a new allergy shot right before that happened.   She started pool therapy, she has 10 sessions over 5 weeks  She has not started the wellbutrin yet. She plans to start that this weekend      Prior to Admission medications    Medication Sig Start Date End Date Taking? Authorizing Provider   doxycycline hyclate (VIBRAMYCIN) 100 MG capsule Take 1 capsule by mouth daily 4/1/25  Yes Provider, MD Brittney   amLODIPine (NORVASC) 10 MG tablet Take 0.5 tablets by mouth daily 2/18/25  Yes

## 2025-05-09 NOTE — PROGRESS NOTES
Identified pt with two pt identifiers (name and ). Reviewed chart in preparation for visit and have obtained necessary documentation.    Nataliya Camacho is a 54 y.o. female  Chief Complaint   Patient presents with    Weight Management     WLC/ 1 month      Ht 1.626 m (5' 4\")   Wt 135.8 kg (299 lb 6.4 oz)   BMI 51.39 kg/m²     1. Have you been to the ER, urgent care clinic since your last visit?  Hospitalized since your last visit? ER- 25, [facility]- Chest Pains and difficulty breathing, vomiting and nausea     2. Have you seen or consulted any other health care providers outside of the [facility] since your last visit?  Include any pap smears or colon screening. No- Pool Therapy     Patient attended triage but did not bring homework form. Patient instructed to email or fax completed homework form to us. Patient informed that not bringing the homework form can result in not being seen next time.

## 2025-05-12 ENCOUNTER — HOSPITAL ENCOUNTER (OUTPATIENT)
Facility: HOSPITAL | Age: 55
Setting detail: RECURRING SERIES
End: 2025-05-12
Payer: COMMERCIAL

## 2025-05-12 ENCOUNTER — TELEPHONE (OUTPATIENT)
Facility: HOSPITAL | Age: 55
End: 2025-05-12

## 2025-05-14 ENCOUNTER — HOSPITAL ENCOUNTER (OUTPATIENT)
Facility: HOSPITAL | Age: 55
Setting detail: RECURRING SERIES
Discharge: HOME | End: 2025-05-17
Payer: COMMERCIAL

## 2025-05-14 PROCEDURE — 97530 THERAPEUTIC ACTIVITIES: CPT

## 2025-05-14 PROCEDURE — 97110 THERAPEUTIC EXERCISES: CPT

## 2025-05-14 NOTE — PROGRESS NOTES
PHYSICAL / OCCUPATIONAL THERAPY - DAILY TREATMENT NOTE (updated )    Patient Name: Nataliya Camacho    Date: 2025    : 1970  Insurance: Payor: RISSA PRESTON / Plan: [employer] [place] [employer] / Product Type: *No Product type* /      Patient  verified Yes     Visit #   Current / Total 3 16   Time   In / Out 5:10 5:48   Pain   In / Out 5/10 5/10   Subjective Functional Status/Changes: Feels the pain the most when transition from sitting to standing. Aquatic PT is going well. Only attended one session so far.      TREATMENT AREA =  Other chronic pain [G89.29]  Other obesity due to excess calories [E66.09]  Pain in right knee [M25.561]  Pain in left knee [M25.562]    OBJECTIVE         Therapeutic Procedures:    Tx Min Billable or 1:1 Min (if diff from Tx Min) Procedure, Rationale, Specifics   28  23190 Therapeutic Exercise (timed):  increase ROM, strength, coordination, balance, and proprioception to improve patient's ability to progress to PLOF and address remaining functional goals. (see flow sheet as applicable)     Details if applicable:         36838 Neuromuscular Re-Education (timed):  improve balance, coordination, kinesthetic sense, posture, core stability and proprioception to improve patient's ability to develop conscious control of individual muscles and awareness of position of extremities in order to progress to PLOF and address remaining functional goals. (see flow sheet as applicable)     Details if applicable:       00917 Manual Therapy (timed):  decrease pain, increase ROM, increase tissue extensibility, decrease edema, correct positional vertigo, decrease trigger points, and increase postural awareness to improve patient's ability to progress to PLOF and address remaining functional goals.  The manual therapy interventions were performed at a separate and distinct time from the therapeutic activities interventions . (see flow sheet as applicable)     Details if applicable:       [address]

## 2025-05-19 ENCOUNTER — APPOINTMENT (OUTPATIENT)
Facility: HOSPITAL | Age: 55
End: 2025-05-19
Payer: COMMERCIAL

## 2025-05-21 ENCOUNTER — APPOINTMENT (OUTPATIENT)
Facility: HOSPITAL | Age: 55
End: 2025-05-21
Payer: COMMERCIAL

## 2025-05-28 ENCOUNTER — APPOINTMENT (OUTPATIENT)
Facility: HOSPITAL | Age: 55
End: 2025-05-28
Payer: COMMERCIAL

## 2025-06-02 ENCOUNTER — APPOINTMENT (OUTPATIENT)
Facility: HOSPITAL | Age: 55
End: 2025-06-02
Payer: COMMERCIAL

## 2025-06-04 ENCOUNTER — APPOINTMENT (OUTPATIENT)
Facility: HOSPITAL | Age: 55
End: 2025-06-04
Payer: COMMERCIAL

## 2025-06-04 ENCOUNTER — HOSPITAL ENCOUNTER (OUTPATIENT)
Facility: HOSPITAL | Age: 55
Setting detail: RECURRING SERIES
End: 2025-06-04
Payer: COMMERCIAL

## 2025-06-04 ENCOUNTER — TELEPHONE (OUTPATIENT)
Facility: HOSPITAL | Age: 55
End: 2025-06-04

## 2025-06-04 NOTE — TELEPHONE ENCOUNTER
Pt called to cxl appt d/t having bronchitis, pt confirmed next appt. pt was notified to give 24hrs notice if cancelling next appt. Same day cxl

## 2025-06-09 ENCOUNTER — APPOINTMENT (OUTPATIENT)
Facility: HOSPITAL | Age: 55
End: 2025-06-09
Payer: COMMERCIAL

## 2025-06-11 ENCOUNTER — APPOINTMENT (OUTPATIENT)
Facility: HOSPITAL | Age: 55
End: 2025-06-11
Payer: COMMERCIAL

## 2025-06-12 NOTE — THERAPY RECERTIFICATION
[facility]  [address], [place] [address] - Ph: [phone]  Fx: [phone]  PHYSICAL THERAPY PROGRESS NOTE  Patient Name: Nataliya Camacho : 1970   Treatment/Medical Diagnosis: Other chronic pain  Other obesity due to excess calories  Pain in right knee  Pain in left knee   Referral Source: Courtney Self MD     Date of Initial Visit: 25 Attended Visits: 4 Missed Visits: 1 NS     SUMMARY OF TREATMENT  Pt has been evaluated/assessed and participated in 4 PT sessions involving skilled therapeutic exercise, functional activity training,?neuromuscular facilitation and coordination training, patient education,?and instruction on HEP in order to improve upon B knee and General B LE ROM, strength, decreased pain, and return to PLOF.    SUBJECTIVE: been dealing with sinus infection, allergies, asthma, and bronchitis for the past month. Also her work schedule changed as well so can only come into PT on .     CURRENT STATUS  Pt has made good progress in PT despite limited attendance to PT. Pt has been sick the past month which attributes to the lack of attendance. Max pain level at 7/10, average pain level at 5/10, least pain level at 0/10. Pt report 45% improvement since beginning therapy. Pt also notes the pain is slightly worse due to changing her work site and having to walk more. The strategies for pain management given to her in PT help with pain management. Pain with initiation still when going from sitting to standing, and it tends to loosen up after she gets walking. Can have pain with prolonged walking, but it does not make her have to stop walking. Reports that having to get up in the mornings, her left ankle is swollen and hurts so much. Notes muscle cramps in the hamstrings after driving and also with awkward movements and certain positions. Pt notes having been performing the HEP as prescribed. Pt will continue

## 2025-06-12 NOTE — PROGRESS NOTES
PHYSICAL / OCCUPATIONAL THERAPY - DAILY TREATMENT NOTE (updated )    Patient Name: Nataliya Camacho    Date: 2025    : 1970  Insurance: Payor: RISSA PRESTON / Plan: [employer] [facility] [employer] / Product Type: *No Product type* /      Patient  verified Yes     Visit #   Current / Total 4 16   Time   In / Out 1:44 2:41   Pain   In / Out 7/10 010   Subjective Functional Status/Changes: See PN     TREATMENT AREA =  Other chronic pain [G89.29]  Other obesity due to excess calories [E66.09]  Pain in right knee [M25.561]  Pain in left knee [M25.562]    OBJECTIVE         Therapeutic Procedures:    Tx Min Billable or 1:1 Min (if diff from Tx Min) Procedure, Rationale, Specifics   30  17144 Therapeutic Exercise (timed):  increase ROM, strength, coordination, balance, and proprioception to improve patient's ability to progress to PLOF and address remaining functional goals. (see flow sheet as applicable)     Details if applicable:    Reassess ROM and strength      42783 Neuromuscular Re-Education (timed):  improve balance, coordination, kinesthetic sense, posture, core stability and proprioception to improve patient's ability to develop conscious control of individual muscles and awareness of position of extremities in order to progress to PLOF and address remaining functional goals. (see flow sheet as applicable)     Details if applicable:       77534 Manual Therapy (timed):  decrease pain, increase ROM, increase tissue extensibility, decrease edema, correct positional vertigo, decrease trigger points, and increase postural awareness to improve patient's ability to progress to PLOF and address remaining functional goals.  The manual therapy interventions were performed at a separate and distinct time from the therapeutic activities interventions . (see flow sheet as applicable)     Details if applicable:       74464 Aquatic Therapy (timed):  decrease pain, improve strength, flexibility, and range of motion using

## 2025-06-13 ENCOUNTER — HOSPITAL ENCOUNTER (OUTPATIENT)
Facility: HOSPITAL | Age: 55
Setting detail: RECURRING SERIES
Discharge: HOME | End: 2025-06-16
Payer: COMMERCIAL

## 2025-06-13 PROCEDURE — 97530 THERAPEUTIC ACTIVITIES: CPT

## 2025-06-13 PROCEDURE — 97110 THERAPEUTIC EXERCISES: CPT

## 2025-06-16 ENCOUNTER — APPOINTMENT (OUTPATIENT)
Facility: HOSPITAL | Age: 55
End: 2025-06-16
Payer: COMMERCIAL

## 2025-06-18 ENCOUNTER — APPOINTMENT (OUTPATIENT)
Facility: HOSPITAL | Age: 55
End: 2025-06-18
Payer: COMMERCIAL

## 2025-06-20 ENCOUNTER — APPOINTMENT (OUTPATIENT)
Facility: HOSPITAL | Age: 55
End: 2025-06-20
Payer: COMMERCIAL

## 2025-06-23 ENCOUNTER — APPOINTMENT (OUTPATIENT)
Facility: HOSPITAL | Age: 55
End: 2025-06-23
Payer: COMMERCIAL

## 2025-06-25 ENCOUNTER — APPOINTMENT (OUTPATIENT)
Facility: HOSPITAL | Age: 55
End: 2025-06-25
Payer: COMMERCIAL

## 2025-06-27 ENCOUNTER — TELEPHONE (OUTPATIENT)
Facility: HOSPITAL | Age: 55
End: 2025-06-27

## 2025-06-27 NOTE — TELEPHONE ENCOUNTER
Called and spoke to pt regarding NS today 6/27/25. Pt stated she forgot about the appt due to being busy with all her other medical appts. Reminded of CXL/NS policy. Scheduled pt for 7/11/25 due to this being her last scheduled appt. All filed for an auth extension due to auth period ending on 7/2/25. Pt verbalized she would be at the appt pending auth approval.

## 2025-07-11 ENCOUNTER — TELEPHONE (OUTPATIENT)
Facility: HOSPITAL | Age: 55
End: 2025-07-11

## 2025-07-11 NOTE — TELEPHONE ENCOUNTER
Called pt regarding NS today 7/11/25. Spoke to pt who stated that her doctor scheduled her for an eval at [facility] to address lymphedema and also have aquatic PT. Due to not being able to do PT in 2 different locations due to insurance, instructed pt that we need to DC here if going to the other clinic. Pt verbalized understanding and agreed upon DC from PT at [facility] today.

## 2025-07-16 ENCOUNTER — OFFICE VISIT (OUTPATIENT)
Age: 55
End: 2025-07-16

## 2025-07-16 DIAGNOSIS — E66.813 CLASS 3 SEVERE OBESITY DUE TO EXCESS CALORIES WITH SERIOUS COMORBIDITY AND BODY MASS INDEX (BMI) OF 50.0 TO 59.9 IN ADULT (HCC): Primary | ICD-10-CM

## 2025-07-22 NOTE — PROGRESS NOTES
[facility]  Metabolic Weight Loss Program        Patient's Name: Nataliya Camacho  : 1970    This patient is a participant at [facility] [place] and attended the weekly virtual nutrition class hosted via [employer].      Dania Rhoades, MS, RD, LDN

## 2025-07-23 ENCOUNTER — OFFICE VISIT (OUTPATIENT)
Age: 55
End: 2025-07-23

## 2025-07-23 DIAGNOSIS — E66.813 CLASS 3 SEVERE OBESITY DUE TO EXCESS CALORIES WITH SERIOUS COMORBIDITY AND BODY MASS INDEX (BMI) OF 50.0 TO 59.9 IN ADULT (HCC): Primary | ICD-10-CM

## 2025-07-25 NOTE — PROGRESS NOTES
[facility]  Metabolic Weight Loss Program        Patient's Name: Nataliya Camacho  : 1970    This patient is a participant at [facility] [place] and attended the weekly virtual nutrition class hosted via [employer].      Dania Rhoades, MS, RD, LDN

## 2025-07-29 LAB
ALBUMIN SERPL-MCNC: 4 G/DL (ref 3.8–4.9)
ALP SERPL-CCNC: 76 IU/L (ref 44–121)
ALT SERPL-CCNC: 35 IU/L (ref 0–32)
AST SERPL-CCNC: 27 IU/L (ref 0–40)
BILIRUB SERPL-MCNC: 0.4 MG/DL (ref 0–1.2)
BUN SERPL-MCNC: 10 MG/DL (ref 6–24)
BUN/CREAT SERPL: 10 (ref 9–23)
CALCIUM SERPL-MCNC: 9.1 MG/DL (ref 8.7–10.2)
CHLORIDE SERPL-SCNC: 103 MMOL/L (ref 96–106)
CHOLEST SERPL-MCNC: 160 MG/DL (ref 100–199)
CO2 SERPL-SCNC: 23 MMOL/L (ref 20–29)
CREAT SERPL-MCNC: 1.04 MG/DL (ref 0.57–1)
EGFRCR SERPLBLD CKD-EPI 2021: 63 ML/MIN/1.73
GLOBULIN SER CALC-MCNC: 2.6 G/DL (ref 1.5–4.5)
GLUCOSE SERPL-MCNC: 90 MG/DL (ref 70–99)
HBA1C MFR BLD: 6.1 % (ref 4.8–5.6)
HDLC SERPL-MCNC: 41 MG/DL
LDLC SERPL CALC-MCNC: 105 MG/DL (ref 0–99)
POTASSIUM SERPL-SCNC: 3.5 MMOL/L (ref 3.5–5.2)
PROT SERPL-MCNC: 6.6 G/DL (ref 6–8.5)
SODIUM SERPL-SCNC: 141 MMOL/L (ref 134–144)
TRIGL SERPL-MCNC: 72 MG/DL (ref 0–149)
VLDLC SERPL CALC-MCNC: 14 MG/DL (ref 5–40)

## 2025-07-31 ENCOUNTER — TELEPHONE (OUTPATIENT)
Age: 55
End: 2025-07-31

## 2025-07-31 NOTE — TELEPHONE ENCOUNTER
Returned patient's call.  \"Call could not be completed at this time.\"    Dr. Morgan will decide at patient's appointment if labs will be necessary.  Labs were just completed on 7/28.

## 2025-07-31 NOTE — TELEPHONE ENCOUNTER
Patient called in to advise they would not be able to get to 1p appointment today until 2p per GPS. Patient stated that they had to be seen for another appointment due to getting ready to have surgery and could not schedule other appointment for another day. Placed patient on hold and reached out to Dr. Morgan and Nurse Shanique. Shanique and Dr. Morgan offered a virtual option. Advised patient, and patient confirmed changing visit from in person to virtual. After call disconnected, advised Per Coordinator Lupe that appointment scheduled for 1p today will have to be in person due to patient not being seen since March 2025. Made Outbound call to patient, No answer, LVM with callback number to office.

## 2025-08-05 ENCOUNTER — OFFICE VISIT (OUTPATIENT)
Age: 55
End: 2025-08-05
Payer: COMMERCIAL

## 2025-08-05 VITALS
OXYGEN SATURATION: 94 % | SYSTOLIC BLOOD PRESSURE: 118 MMHG | RESPIRATION RATE: 18 BRPM | BODY MASS INDEX: 49.61 KG/M2 | WEIGHT: 290.6 LBS | HEIGHT: 64 IN | DIASTOLIC BLOOD PRESSURE: 77 MMHG | HEART RATE: 89 BPM | TEMPERATURE: 98.8 F

## 2025-08-05 DIAGNOSIS — E78.00 HYPERCHOLESTEROLEMIA: ICD-10-CM

## 2025-08-05 DIAGNOSIS — E66.813 CLASS 3 SEVERE OBESITY DUE TO EXCESS CALORIES WITH SERIOUS COMORBIDITY AND BODY MASS INDEX (BMI) OF 45.0 TO 49.9 IN ADULT (HCC): Primary | ICD-10-CM

## 2025-08-05 DIAGNOSIS — E11.9 TYPE 2 DIABETES MELLITUS WITHOUT COMPLICATION, WITHOUT LONG-TERM CURRENT USE OF INSULIN (HCC): ICD-10-CM

## 2025-08-05 DIAGNOSIS — I10 ESSENTIAL HYPERTENSION: ICD-10-CM

## 2025-08-05 DIAGNOSIS — G47.33 OSA ON CPAP: ICD-10-CM

## 2025-08-05 PROCEDURE — 3074F SYST BP LT 130 MM HG: CPT | Performed by: FAMILY MEDICINE

## 2025-08-05 PROCEDURE — 3078F DIAST BP <80 MM HG: CPT | Performed by: FAMILY MEDICINE

## 2025-08-05 PROCEDURE — 99214 OFFICE O/P EST MOD 30 MIN: CPT | Performed by: FAMILY MEDICINE

## 2025-08-05 PROCEDURE — 3044F HG A1C LEVEL LT 7.0%: CPT | Performed by: FAMILY MEDICINE

## 2025-08-05 RX ORDER — TIRZEPATIDE 5 MG/.5ML
5 INJECTION, SOLUTION SUBCUTANEOUS
COMMUNITY
Start: 2025-07-21

## 2025-08-05 RX ORDER — OMEPRAZOLE 40 MG/1
40 CAPSULE, DELAYED RELEASE ORAL
COMMUNITY
Start: 2025-07-30 | End: 2026-07-30

## 2025-08-05 RX ORDER — POTASSIUM CHLORIDE 1.5 G/1
POWDER, FOR SOLUTION ORAL
COMMUNITY
Start: 2025-07-20

## 2025-08-05 ASSESSMENT — PATIENT HEALTH QUESTIONNAIRE - PHQ9
SUM OF ALL RESPONSES TO PHQ QUESTIONS 1-9: 0
2. FEELING DOWN, DEPRESSED OR HOPELESS: NOT AT ALL
1. LITTLE INTEREST OR PLEASURE IN DOING THINGS: NOT AT ALL
SUM OF ALL RESPONSES TO PHQ QUESTIONS 1-9: 0

## 2025-08-20 ENCOUNTER — OFFICE VISIT (OUTPATIENT)
Age: 55
End: 2025-08-20

## 2025-08-20 DIAGNOSIS — E66.813 CLASS 3 SEVERE OBESITY DUE TO EXCESS CALORIES WITH SERIOUS COMORBIDITY AND BODY MASS INDEX (BMI) OF 45.0 TO 49.9 IN ADULT (HCC): Primary | ICD-10-CM

## 2025-09-07 LAB
ALBUMIN SERPL-MCNC: 4.2 G/DL (ref 3.8–4.9)
ALP SERPL-CCNC: 107 IU/L (ref 44–121)
ALT SERPL-CCNC: 179 IU/L (ref 0–32)
AST SERPL-CCNC: 68 IU/L (ref 0–40)
BILIRUB SERPL-MCNC: 0.5 MG/DL (ref 0–1.2)
BUN SERPL-MCNC: 14 MG/DL (ref 6–24)
BUN/CREAT SERPL: 12 (ref 9–23)
CALCIUM SERPL-MCNC: 8.8 MG/DL (ref 8.7–10.2)
CHLORIDE SERPL-SCNC: 100 MMOL/L (ref 96–106)
CHOLEST SERPL-MCNC: 143 MG/DL (ref 100–199)
CO2 SERPL-SCNC: 25 MMOL/L (ref 20–29)
CREAT SERPL-MCNC: 1.2 MG/DL (ref 0.57–1)
EGFRCR SERPLBLD CKD-EPI 2021: 53 ML/MIN/1.73
GLOBULIN SER CALC-MCNC: 2.3 G/DL (ref 1.5–4.5)
GLUCOSE SERPL-MCNC: 82 MG/DL (ref 70–99)
HDLC SERPL-MCNC: 45 MG/DL
LDLC SERPL CALC-MCNC: 85 MG/DL (ref 0–99)
POTASSIUM SERPL-SCNC: 3.1 MMOL/L (ref 3.5–5.2)
PROT SERPL-MCNC: 6.5 G/DL (ref 6–8.5)
SODIUM SERPL-SCNC: 140 MMOL/L (ref 134–144)
TRIGL SERPL-MCNC: 61 MG/DL (ref 0–149)
VLDLC SERPL CALC-MCNC: 13 MG/DL (ref 5–40)